# Patient Record
Sex: MALE | Race: WHITE | Employment: UNEMPLOYED | URBAN - METROPOLITAN AREA
[De-identification: names, ages, dates, MRNs, and addresses within clinical notes are randomized per-mention and may not be internally consistent; named-entity substitution may affect disease eponyms.]

---

## 2020-08-31 ENCOUNTER — HOSPITAL ENCOUNTER (OUTPATIENT)
Dept: PHYSICAL THERAPY | Age: 65
Setting detail: THERAPIES SERIES
Discharge: HOME | End: 2020-08-31
Payer: COMMERCIAL

## 2020-08-31 PROCEDURE — 97530 THERAPEUTIC ACTIVITIES: CPT

## 2020-08-31 PROCEDURE — 97162 PT EVAL MOD COMPLEX 30 MIN: CPT

## 2020-08-31 ASSESSMENT — PAIN SCALES - QUEBEC BACK PAIN DISABILITY SCALE
RUN ONE BLOCK OR 100M: 5
LIFT AND CARRY A HEAVY SUITCASE: 2
SIT IN A CHAIR FOR SEVERAL HOURS: 0
GET OUT OF BED: 0
REACH UP TO HIGH SHELVES: 1
SLEEP THROUGH THE NIGHT: 1
TAKE FOOD OUT OF THE REFRIGERATOR: 1
STAND UP FOR 20 TO 30 MINUTES: 4
THROW A BALL: 1
QUEBEC CMS MODIFIER: CK
LIFT AND CARRY A HEAVY SUITCASE: 2
TURN OVER IN BED: 1
CARRY TWO BAGS OF GROCERIES: 2
WALK SEVERAL KILOMETERS  OR MILES: 3
MOVE A CHAIR: 5
RUN ONE BLOCK OR 100M: 5
STAND UP FOR 20 TO 30 MINUTES: 4
RIDE IN A CAR: 1
BEND OVER TO CLEAN THE BATHTUB: 5
QUEBEC DISABILITY INDEX: 40-59%
REACH UP TO HIGH SHELVES: 1
GET OUT OF BED: 0
PULL OR PUSH HEAVY DOORS: 4
MOVE A CHAIR: 5
CARRY TWO BAGS OF GROCERIES: 2
WALK A FEW BLOCKS OR 300 TO 400M: 2
MAKE YOUR BED: 1
CLIMB ONE FLIGHT OF STAIRS: 2
RIDE IN A CAR: 1
WALK A FEW BLOCKS OR 300 TO 400M: 2
TURN OVER IN BED: 1
PULL OR PUSH HEAVY DOORS: 4
SLEEP THROUGH THE NIGHT: 1
CLIMB ONE FLIGHT OF STAIRS: 2
BEND OVER TO CLEAN THE BATHTUB: 5
SIT IN A CHAIR FOR SEVERAL HOURS: 0
PUT ON SOCKS OR PANYHOSE: 2
TAKE FOOD OUT OF THE REFRIGERATOR: 1
MAKE YOUR BED: 1
WALK SEVERAL KILOMETERS  OR MILES: 3
THROW A BALL: 1
TOTAL SCORE: 43
PUT ON SOCKS OR PANYHOSE: 2

## 2020-08-31 NOTE — PROGRESS NOTES
Physical Therapy  Initial Assessment  Date: 2020  Patient Name: Adan Vides  MRN: [id number]  : 1955     Treatment Diagnosis: Decreased functional mobilty post lumbar fusion redo 2020    Restrictions  Position Activity Restriction  Other position/activity restrictions: Low fall risk,  Per pt, \"NO BENDING, LIFTING, TWISTING, PULLING\"    Subjective   General  Chart Reviewed: Yes  Additional Pertinent Hx: DM2, HTN, HLD, tremor, depression, allergic rhinitis, renal CA,  right TKR 2019, two cryoablations. Referring Practitioner: Jagjit Butler  Referral Date : 20  Diagnosis: Lumbar fusion L3-5  PT Visit Information  Onset Date: 20  PT Insurance Information: BCBS  Subjective  Subjective: Had a decompression at [address] in  May 13 2019, developed synovial cyst.  To [place] for L3-5 fusion 2019. Was doing pretty well till he fell and slipped from the desk chair, and has had numbness and tingling in the left leg since that time. Per pt \"issues were noted at L3 and some of the screws are loose  \"Had a revision surgery in July of this year. In terms of syptoms pt has had resolution of pain in feet,  has some numbenss but less in both lateral thighs which is also less. Pain Screening  Patient Currently in Pain: Yes(4/10)      Vision/Hearing  Vision  Vision: Within Functional Limits(reading glasses)  Hearing  Hearing: Exceptions to WFL(has hearing aides but does not wear them.)  Hearing Exceptions: Hard of hearing/hearing concerns;Bilateral hearing aid    Orientation  Orientation  Overall Orientation Status: Within Normal Limits    Social/Functional History  Social/Functional History  Lives With: Spouse  Type of Home: House  Home Layout: One level; Laundry in basement  Home Access: Stairs to enter without rails  Entrance Stairs - Number of Steps: one plus one step to enter  Bathroom Shower/Tub: Walk-in shower  Bathroom Toilet: Handicap height  Bathroom Equipment: Built-in shower goals: Discharge 8 weeks  Short term goal 1: Normalize LE ROM to allow increased ease with dressing and bathing  Short term goal 2: Increase LE strength to 4+/5 to allow pt to rise from chair without use of UE  Short term goal 3: Functional trunk ROM for normal ADL and homemaking  Patient Goals   Patient goals : \"To get back as much range of motion as possible and get strength built back up in my legs.       Aquiles Martinez, [address] [facility]

## 2020-08-31 NOTE — FLOWSHEET NOTE
Physical Therapy Daily Treatment Note  Date:  2020    Patient Name:  Malinda Chavira    :  1955  MRN: [id number]    Restrictions/Precautions: Position Activity Restriction  Other position/activity restrictions: Low fall risk,  Per pt, \"NO BENDING, LIFTING, TWISTING, PULLING\"      Pertinent Medical History: Additional Pertinent Hx: DM2, HTN, HLD, tremor, depression, allergic rhinitis, renal CA,  right TKR 2019, two cryoablations. Medical/Treatment Diagnosis Information:  · Diagnosis: Lumbar fusion L3-5  · Treatment Diagnosis: Decreased functional mobilty post lumbar fusion redo 7106    Insurance/Certification information:  PT Insurance Information: [facility]  Physician Information:  Referring Practitioner: [facility] signed (Y/N):  sent on eval date    Visit# / total visits:    Pain level: 0/10     History of Injury:   Had a decompression at [address] in  May 13 2019, developed synovial cyst.  To [place] for L3-5 fusion 2019. Was doing pretty well till he fell and slipped from the desk chair, and has had numbness and tingling in the left leg since that time. Per pt \"issues were noted at L3 and some of the screws are loose  \"Had a revision surgery in July of this year. In terms of syptoms pt has had resolution of pain in feet,  has some numbenss but less in both lateral thighs which is also less. Subjective:   Currently uses brace when outside of the house,  Pain generally 4/10. Limited in terms of strength, endurance    Objective:   LE's WFL ROM, strength 4/5 except lisa hip flexion  4-/5      Exercise/Equipment Resistance/Repetitions Other comments        [facility]     Review of home set up  REview of prior PT     Aquatics at [facility]         REview of restrictions No BLT P                   Pool tour, review of policies and procedures Pt verbalized understanding.       Other Therapeutic Activities:  Pt was educated on PT POC, Diagnosis, Prognosis, related to loss of mobility, strength, balance, and coordination. Gait Training:  [] (60681) Provided training and instruction to the patient for proper postural muscle recruitment and positioning with ambulation re-education     Home Exercise Program:    [] (91855) Reviewed/Progressed HEP activities related to strengthening, flexibility, endurance, ROM for functional self-care, mobility, lifting and ambulation   [] (76426) Reviewed/Progressed HEP activities related to improving balance, coordination, kinesthetic sense, posture, motor skill, proprioception for self-care, mobility, lifting, and ambulation      Manual Treatments:  MFR / STM / Oscillations-Mobs:  G-I, II, III, IV / Manipulation / MLD  [] (57354) Provided manual therapy to mobilize  soft tissue/joints/fluid for the purpose of modulating pain, promoting relaxation, increasing ROM, reducing/eliminating soft tissue swelling/inflammation/restriction, improving soft tissue extensibility and allowing for proper ROM for normal function with self- care, mobility, lifting and ambulation.       Timed Code Treatment Minutes: 30   Total Treatment Minutes: 45     [] EVAL (LOW) 31192   [x] EVAL (MOD) 46243   [] EVAL (HIGH) 15743   [] RE-EVAL   [] TE (40470) x     [] Aquatic (85748) x  [] NMR (40221)   x  [] Aquatic Group (17538) x  [] Manual (53507) x    [] Ultrasound (41590) x  [x] TA (01861) x  2  [] Mech Traction (34786)  [] Ionto (76563)           [] ES (un) (97154):   [] Vasopump (98629) [] Other:            Electronically signed by:  Dois Severs, PT [id number] (DPT, MS)

## 2020-08-31 NOTE — PLAN OF CARE
Outpatient Physical Therapy  [x] [facility]    Phone: [phone]   Fax: [phone]   [] [facility]  Phone: [phone]              Fax: [phone]  [] [facility]   Phone: [phone]   Fax: [phone]     To: Referring Practitioner: Mirian Friedman      Patient: Monet Gonzalez   : 1955   MRN: [id number]  Evaluation Date: 2020      Diagnosis Information:  · Diagnosis: Lumbar fusion L3-5   · Treatment Diagnosis: Decreased functional mobilty post lumbar fusion redo 2020     Sameer Hurley  Dear Dr. Mirian Friedman  The following patient has been evaluated for physical therapy services and for therapy to continue, Medicare requires monthly physician review of the treatment plan. Please review the attached evaluation and/or summary of the patient's plan of care, and verify that you agree therapy should continue by signing the attached document and sending it back to our office. Plan of Care/Treatment to date:  [] Therapeutic Exercise    [] Modalities:  [] Therapeutic Activity     [] Ultrasound  [] Electrical Stimulation  [] Gait Training      [] Cervical Traction [] Lumbar Traction  [] Neuromuscular Re-education    [] Cold/hotpack [] Iontophoresis   [] Instruction in HEP     Other:  [] Manual Therapy      []             [x] Aquatic Therapy      []           ? Frequency/Duration:  # Days per week: [] 1 day # Weeks: [] 1 week [] 5 weeks     [x] 2 days? [] 2 weeks [] 6 weeks     [] 3 days   [] 3 weeks [] 7 weeks     [] 4 days   [] 4 weeks [x] 8 weeks    Rehab Potential: [] Excellent [x] Good [] Fair  [] Poor       Electronically signed by:  Laz Laguerre,       If you have any questions or concerns, please don't hesitate to call.   Thank you for your referral.      Physician Signature:________________________________Date:__________________  By signing above, therapists plan is approved by physician

## 2020-09-08 ENCOUNTER — APPOINTMENT (OUTPATIENT)
Dept: PHYSICAL THERAPY | Age: 65
End: 2020-09-08
Payer: COMMERCIAL

## 2020-09-10 ENCOUNTER — HOSPITAL ENCOUNTER (OUTPATIENT)
Dept: PHYSICAL THERAPY | Age: 65
Setting detail: THERAPIES SERIES
Discharge: HOME | End: 2020-09-10
Payer: COMMERCIAL

## 2020-09-10 PROCEDURE — 97113 AQUATIC THERAPY/EXERCISES: CPT

## 2020-09-10 NOTE — FLOWSHEET NOTE
Physical Therapy Aquatic Flow Sheet   Date:  9/10/2020    Patient Name:  Alba Morales    :  1955     Restrictions/Precautions: Position Activity Restriction  Other position/activity restrictions: Low fall risk,  Per pt, \"NO BENDING, LIFTING, TWISTING, PULLING\"      Pertinent Medical History: Additional Pertinent Hx: DM2, HTN, HLD, tremor, depression, allergic rhinitis, renal CA,  right TKR 2019, two cryoablations.     Medical/Treatment Diagnosis Information:  · Diagnosis: Lumbar fusion L3-5  · Treatment Diagnosis: Decreased functional mobilty post lumbar fusion redo 5389     Insurance/Certification information:  PT Insurance Information: [facility]  Physician Information:  Referring Practitioner: Serena Falk       F/U 10-8-20  Plan of care signed (Y/N):  sent on eval date     Visit# / total visits:    Pain level:      LB/ L LE  4/10                      Pain after Rx 4/10      History of Injury:   Had a decompression at [facility] in  May 13 2019, developed synovial cyst.  To [place] for L3-5 fusion 2019. Was doing pretty well till he fell and slipped from the desk chair, and has had numbness and tingling in the left leg since that time. Per pt \"issues were noted at L3 and some of the screws are loose  \"Had a revision surgery in July of this year. In terms of syptoms pt has had resolution of pain in feet,  has some numbenss but less in both lateral thighs which is also less.       Subjective:  C/o of LBP, L LE numbness. [facility]     Objective:   Observation:  See eval   Test measurements:      Key  B= Belt DB= Dumbells T= Theratube   G=Gloves N= Noodles W= Weights   P= Paddles WW=Water Walker K= Kickboard        Transfers:   steps ind/ cues for safety      % Immersion: 75%            Ambulation:  laps  ind Exercises UE:  B / standing balance/ L-S stab    Forwards 3 Shoulder Shrugs     Lateral  Shoulder circles     Marching    Scapular Retraction      Retro   Rolling  10 Cariocas  Push Downs 10     IR/ER 10     Punching    Stretching: B    30 sec  Rowing 10   Gastroc/Soleus  2 Elbow Flex/Ext 10   Hamstring  2   First step Shldr Flex/Ext     Knee flex stretch   Shldr Abd/Add    Piriformis   Horiz Abd/Add     Hip flexor    Arm Circles     SKTC   PNF Diagonals    DKTC  UE oscillations f/b, s/s    ITB   Wall Push-ups    Quad  Figure 8's    Mid back   Buoy pull/push downs    UT  Tband rows    Rhom  Tband lats    Post Shoulder  Lumbar Rot w/ paddles    Pec   Small ball pull downs                   diagonals             Cervical:       AROM Flex       AROM Extension     LEs exercises:  B AROM Side Bending    Marching  10 AROM Rotation    Heel Raises  10 Chin tucks    Toe Raises  10 Chin nods     Squats  6     aggravated      Hamstring Curls  10      Hip Flexion  10 Balance:      Hip Abduction 10 SLS    Hip Circles    cw 10 Tandem stance    TA set 10 NBOS eyes open    Glut Set 10 NBOS eyes closed    Hip Extension  Hand to Opposite Knee    Hip Adduction    Box Step     Hip IR   Noodle Stance     Hip ER  Stop/Go Gait    Fig 8's  Switch Gait                Seated: B     bench Functional:    Ankle Pumps 10 Step up forward    Ankle circles cw/ccw 10 Step up lateral    Knee flex & ext 10 Step down    Hip Abd & Adduction 10 [place] squats    Bicycle  10 Crate Lifts    Add Set with ball 10 Lunges forward    LX stab with med ball throws  Lunges lateral    Ankle INV  Lunges retro    Ankle EV  Lower ab curl with noodle      Upper ab curl with ball      Med ball straight lifts      Med ball diagonal lifts      Hydrorider          Noodle:      Leg Press  Deep Water:    Noodle hang at wall  Jog    Noodle hang deep water  Jumping Jacks    Noodle Bicycle  Heel to toe      Hand to opposite knee      Cross country skier      Rocking Horse        Charges:  Individual Aquatic Therapy:  [x] (45287) Provided verbal and tactile cueing for strengthening, flexibility, ROM using the therapeutic properties of water (buoyancy, resistance) for improvements in core control, mobility and ambulation     Aquatic Group:  [] (54393) Provided intermittent verbal and tactile cueing for strengthening, endurance, flexibility and ROM for 2-4 individuals that do not require one-on one patient contact by the therapist     Individual Aquatic Minutes: [address] Minutes:      [x] Aquatic (17952) x 3    [] Aquatic Group ([phone]) x    Treatment/Activity Tolerance:  [x] Patient tolerated treatment well [] Patient limited by fatique  [] Patient limited by pain  [] Patient limited by other medical complications  [x] Other: verbal cues for correct tech with ex. Advised on use of ice if needed for soreness post ex. Prognosis: [x] Good [] Fair  [] Poor    Patient Requires Follow-up: [x] Yes  [] No    Plan:   [x] Continue per plan of care [] Alter current plan (see comments)  [] Plan of care initiated [] Hold pending MD visit [] Discharge      Plan for Next Session:  Increase gt, hip circles ccw, increase UE ex with L-S stabilization.           Electronically signed by: Kolby Sesay,

## 2020-09-15 ENCOUNTER — HOSPITAL ENCOUNTER (OUTPATIENT)
Dept: PHYSICAL THERAPY | Age: 65
Setting detail: THERAPIES SERIES
Discharge: HOME | End: 2020-09-15
Payer: COMMERCIAL

## 2020-09-15 PROCEDURE — 97113 AQUATIC THERAPY/EXERCISES: CPT

## 2020-09-15 NOTE — FLOWSHEET NOTE
Physical Therapy Aquatic Flow Sheet   Date:  9/15/2020    Patient Name:  Zan Oropeza    :  1955     Restrictions/Precautions: Position Activity Restriction  Other position/activity restrictions: Low fall risk,  Per pt, \"NO BENDING, LIFTING, TWISTING, PULLING\"      Pertinent Medical History: Additional Pertinent Hx: DM2, HTN, HLD, tremor, depression, allergic rhinitis, renal CA,  right TKR 2019, two cryoablations.     Medical/Treatment Diagnosis Information:  · Diagnosis: Lumbar fusion L3-5  · Treatment Diagnosis: Decreased functional mobilty post lumbar fusion redo 9791     Insurance/Certification information:  PT Insurance Information: [facility]  Physician Information:  Referring Practitioner: Mando Harrison       F/U 10-8-20  Plan of care signed (Y/N):  sent on eval date     Visit# / total visits:  3/16  Pain level:      LB/ L LE  4/10                      Pain after Rx 4/10      History of Injury:   Had a decompression at [address] in  May 13 2019, developed synovial cyst.  To [place] for L3-5 fusion 2019. Was doing pretty well till he fell and slipped from the desk chair, and has had numbness and tingling in the left leg since that time. Per pt \"issues were noted at L3 and some of the screws are loose  \"Had a revision surgery in July of this year. In terms of syptoms pt has had resolution of pain in feet,  has some numbenss but less in both lateral thighs which is also less.       Subjective:  Reports increased pain x 2 days 10 after first aquatic ex. Reports at home mostly sitting/ lying in recliner and doing very light activity. Trying to walk 10 minutes 5-6 x/ day. Did apply ice 1 x after first aquatic session.      Objective:   Observation:  See eval   Test measurements:      Key  B= Belt DB= Dumbells T= Theratube   G=Gloves N= Noodles W= Weights   P= Paddles WW=Water Walker K= Kickboard        Transfers:   steps ind/ cues for safety      % Immersion: 75% Ambulation:  laps  ind Exercises UE:  B / standing balance/ L-S stab, slow gentle,    Forwards 3 Shoulder Shrugs 10    Lateral  Shoulder circles bckwds 10    Marching    Scapular Retraction      Retro   Rolling  10    Cariocas  Push Downs 10     IR/ER 10     Punching    Stretching: B    30 sec  Rowing 10   Gastroc/Soleus Elbow Flex/Ext 10   Hamstring Shldr Flex/Ext     Knee flex stretch   Shldr Abd/Add    Piriformis   Horiz Abd/Add     Hip flexor    Arm Circles     SKTC   PNF Diagonals    DKTC  UE oscillations f/b, s/s    ITB   Wall Push-ups    Quad  Figure 8's    Mid back   Buoy pull/push downs    UT  Tband rows    Rhom  Tband lats    Post Shoulder  Lumbar Rot w/ paddles    Pec   Small ball pull downs                   diagonals             Cervical:       AROM Flex       AROM Extension     LEs exercises:  B  slow, gentle,  AROM Side Bending    Marching  10 AROM Rotation    Heel Raises  10 Chin tucks    Toe Raises  10 Chin nods     Squats  10    manju ok today      Hamstring Curls  10      Hip Flexion  10 Balance:      Hip Abduction 10 SLS    Hip Circles    cw 10 Tandem stance    TA set 10 NBOS eyes open    Glut Set 10 NBOS eyes closed    Hip Extension  Hand to Opposite Knee    Hip Adduction    Box Step     Hip IR   Noodle Stance     Hip ER  Stop/Go Gait    Fig 8's  Switch Gait                Seated: B     bench Functional:    Ankle Pumps Step up forward    Ankle circles cw/ccw Step up lateral    Knee flex & ext Step down    Hip Abd & Adduction [place] squats    Bicycle  Crate Lifts    Add Set with ball Lunges forward    LX stab with med ball throws  Lunges lateral    Ankle INV  Lunges retro    Ankle EV  Lower ab curl with noodle      Upper ab curl with ball      Med ball straight lifts      Med ball diagonal lifts      Hydrorider          Noodle:      Leg Press  Deep Water:    Noodle hang at wall  Jog    Noodle hang deep water  Jumping Jacks    Noodle Bicycle  Heel to toe      Hand to opposite knee      [facility] country skier      Rocking Horse        Charges:  Individual Aquatic Therapy:  [x] (68428) Provided verbal and tactile cueing for strengthening, flexibility, ROM using the therapeutic properties of water (buoyancy, resistance) for improvements in core control, mobility and ambulation     Aquatic Group:  [] (47889) Provided intermittent verbal and tactile cueing for strengthening, endurance, flexibility and ROM for 2-4 individuals that do not require one-on one patient contact by the therapist     Individual Aquatic Minutes: 40   Aquatic Group Minutes:      [x] Aquatic (43147) x 3    [] Aquatic Group ([phone]) x    Treatment/Activity Tolerance:  [x] Patient tolerated treatment well [] Patient limited by fatique  [] Patient limited by pain  [] Patient limited by other medical complications  [x] Other: verbal cues for correct tech with ex. Seems to flare easily. Advised to increase use of ice after aquatic ex 3 x . All ex slow, gentle w/o increased pain. Prognosis: [x] Good [] Fair  [] Poor    Patient Requires Follow-up: [x] Yes  [] No    Plan:   [x] Continue per plan of care [] Alter current plan (see comments)  [] Plan of care initiated [] Hold pending MD visit [] Discharge      Plan for Next Session:  Increase gt, hip circles ccw, increase UE ex with L-S stabilization. as tolerated.           Electronically signed by: Tonny Bowman,

## 2020-09-17 ENCOUNTER — HOSPITAL ENCOUNTER (OUTPATIENT)
Dept: PHYSICAL THERAPY | Age: 65
Setting detail: THERAPIES SERIES
Discharge: HOME | End: 2020-09-17
Payer: COMMERCIAL

## 2020-09-17 PROCEDURE — 97113 AQUATIC THERAPY/EXERCISES: CPT

## 2020-09-17 NOTE — FLOWSHEET NOTE
Physical Therapy Aquatic Flow Sheet   Date:  2020    Patient Name:  Aileen Frias    :  1955     Restrictions/Precautions: Position Activity Restriction  Other position/activity restrictions: Low fall risk,  Per pt, \"NO BENDING, LIFTING, TWISTING, PULLING\"      Pertinent Medical History: Additional Pertinent Hx: DM2, HTN, HLD, tremor, depression, allergic rhinitis, renal CA,  right TKR 2019, two cryoablations.     Medical/Treatment Diagnosis Information:  · Diagnosis: Lumbar fusion L3-5  · Treatment Diagnosis: Decreased functional mobilty post lumbar fusion redo 4204     Insurance/Certification information:  PT Insurance Information: [facility]  Physician Information:  Referring Practitioner: Abby Vela       F/U 10-8-20  Plan of care signed (Y/N):  sent on eval date     Visit# / total visits:    Pain level:      LB/ L LE  4/10                      Pain after Rx 4/10      History of Injury:   Had a decompression at [address] in  May 13 2019, developed synovial cyst.  To [place] for L3-5 fusion 2019. Was doing pretty well till he fell and slipped from the desk chair, and has had numbness and tingling in the left leg since that time. Per pt \"issues were noted at L3 and some of the screws are loose  \"Had a revision surgery in July of this year. In terms of syptoms pt has had resolution of pain in feet,  has some numbenss but less in both lateral thighs which is also less.       Subjective:  reports less soreness after last aquatic ex that evening, better by next am.  Pt reports has had Knot at times L LB since Sx.     Objective:   Observation:  No TTP LPVM, no knot present today L LB   Test measurements:      Key  B= Belt DB= Dumbells T= Theratube   G=Gloves N= Noodles W= Weights   P= Paddles WW=Water Walker K= Kickboard        Transfers:   steps ind/ cues for safety      % Immersion: 75%            Ambulation:  laps  ind Exercises UE:  B / standing balance/ L-S stab, slow gentle, Forwards 3+1  Shoulder Shrugs 10    Lateral  Shoulder circles bckwds 10    Marching    Scapular Retraction      Retro   Rolling  10    Cariocas  Push Downs 10     IR/ER 10     Punching    Stretching: B    30 sec  Rowing 10   Gastroc/Soleus Elbow Flex/Ext 10   Hamstring Shldr Flex/Ext  10   Knee flex stretch   Shldr Abd/Add    Piriformis   Horiz Abd/Add     Hip flexor    Arm Circles     SKTC   PNF Diagonals    DKTC  UE oscillations f/b, s/s    ITB   Wall Push-ups    Quad  Figure 8's    Mid back   Buoy pull/push downs    UT  Tband rows    Rhom  Tband lats    Post Shoulder  Lumbar Rot w/ paddles    Pec   Small ball pull downs                   diagonals             Cervical:       AROM Flex       AROM Extension     LEs exercises:  B  slow, gentle,  AROM Side Bending    Marching  10 AROM Rotation    Heel Raises  10 Chin tucks    Toe Raises  10 Chin nods     Squats  10         Hamstring Curls  10      Hip Flexion  10 Balance:      Hip Abduction 10 SLS    Hip Circles    cw/ccw 10 Tandem stance    TA set 10 NBOS eyes open    Glut Set 10 NBOS eyes closed    Hip Extension  Hand to Opposite Knee    Hip Adduction    Box Step     Hip IR   Noodle Stance     Hip ER  Stop/Go Gait    Fig 8's  Switch Gait                Seated: B     Bench   some relief with sitting Functional:    Ankle Pumps 10 Step up forward    Ankle circles cw/ccw 10 Step up lateral    Knee flex & ext 10 Step down    Hip Abd & Adduction 10 [place] squats    Bicycle  10 Crate Lifts    Add Set with ball 10 Lunges forward    LX stab with med ball throws  Lunges lateral    Ankle INV  Lunges retro    Ankle EV  Lower ab curl with noodle      Upper ab curl with ball      Med ball straight lifts      Med ball diagonal lifts      Hydrorider          Noodle:      Leg Press  Deep Water:    Noodle hang at wall  Jog    Noodle hang deep water  Jumping Jacks    Noodle Bicycle  Heel to toe      Hand to opposite knee      Cross country skier      Rocking Horse Charges:  Individual Aquatic Therapy:  [x] (58029) Provided verbal and tactile cueing for strengthening, flexibility, ROM using the therapeutic properties of water (buoyancy, resistance) for improvements in core control, mobility and ambulation     Aquatic Group:  [] (76950) Provided intermittent verbal and tactile cueing for strengthening, endurance, flexibility and ROM for 2-4 individuals that do not require one-on one patient contact by the therapist     Individual Aquatic Minutes: [address] Minutes:      [x] Aquatic (48425) x 3    [] Aquatic Group (22243) x    Treatment/Activity Tolerance:  [x] Patient tolerated treatment well [] Patient limited by fatique  [] Patient limited by pain  [] Patient limited by other medical complications  [x] Other: verbal cues for correct tech/posture/ TA activation with ex. Seems to flare easily. Advised to increase use of ice after aquatic ex 3 x . All ex slow, gentle w/o increased pain. Monitor ex carefully as not to exacerbate pain. Relief from sitting after increased discomfort LB from standing/ walking activity ~ 30min. Prognosis: [x] Good [] Fair  [] Poor    Patient Requires Follow-up: [x] Yes  [] No    Plan:   [x] Continue per plan of care [] Alter current plan (see comments)  [] Plan of care initiated [] Hold pending MD visit [] Discharge      Plan for Next Session:  Increase gt, hip circles ccw, increase UE ex with L-S stabilization. as tolerated.           Electronically signed by: Aleena Rg,

## 2020-09-22 ENCOUNTER — APPOINTMENT (OUTPATIENT)
Dept: PHYSICAL THERAPY | Age: 65
End: 2020-09-22
Payer: COMMERCIAL

## 2020-09-24 ENCOUNTER — HOSPITAL ENCOUNTER (OUTPATIENT)
Dept: PHYSICAL THERAPY | Age: 65
Setting detail: THERAPIES SERIES
Discharge: HOME | End: 2020-09-24
Payer: COMMERCIAL

## 2020-09-24 PROCEDURE — 97113 AQUATIC THERAPY/EXERCISES: CPT

## 2020-09-24 NOTE — FLOWSHEET NOTE
Ambulation:  laps  ind Exercises UE:  B / standing balance/ L-S stab, slow gentle,    Forwards 3 Shoulder Shrugs    Lateral  Shoulder circles bckwds    Marching    Scapular Retraction  10    Retro   Rolling  10    Cariocas  Push Downs 10     IR/ER 10     Punching    Stretching: B    30 sec  Rowing 10   Gastroc/Soleus  2Elbow Flex/Ext 10   Hamstring Shldr Flex/Ext  10   Knee flex stretch   Shldr Abd/Add 10   Piriformis   Horiz Abd/Add  10   Hip flexor    Arm Circles  10   SKTC   PNF Diagonals 10   DKTC  UE oscillations f/b, s/s    ITB   Wall Push-ups    Quad  Figure 8's    Mid back   Buoy pull/push downs    UT  Tband rows    Rhom  Tband lats    Post Shoulder  Lumbar Rot w/ paddles    Pec   Small ball pull downs                   diagonals             Cervical:       AROM Flex       AROM Extension     LEs exercises:  B  slow, gentle,  AROM Side Bending    Marching  10 AROM Rotation    Heel Raises  10 Chin tucks    Toe Raises  10 Chin nods     Squats  10         Hamstring Curls  10      Hip Flexion  10 Balance: Hip Abduction 10 SLS    Hip Circles    cw/ccw 10 Tandem stance    TA set  NBOS eyes open    Glut Set 10 NBOS eyes closed    Hip Extension  Hand to Opposite Knee    Hip Adduction    Box Step     Hip IR   Noodle Stance     Hip ER  Stop/Go Gait    Fig 8's  Switch Gait                Seated: B     Bench      relief with sitting.  Relief with ding sitting ex mid-way thru treatmentt Functional:    Ankle Pumps 15 Step up forward    Ankle circles cw/ccw 10 Step up lateral    Knee flex & ext 15 Step down    Hip Abd & Adduction 15 Dunbar squats    Bicycle  15 Crate Lifts    Add Set with ball 10 Lunges forward    LX stab with med ball throws  Lunges lateral    Ankle INV  Lunges retro    Ankle EV  Lower ab curl with noodle      Upper ab curl with ball      Med ball straight lifts      Med ball diagonal lifts      Hydrorider          Noodle:      Leg Press  Deep Water:    Noodle hang at wall  Jog    Noodle hang deep water  Jumping Jacks    Noodle Bicycle  Heel to toe      Hand to opposite knee      Cross country skier      Rocking Horse        Charges:  Individual Aquatic Therapy:  [x] (27638) Provided verbal and tactile cueing for strengthening, flexibility, ROM using the therapeutic properties of water (buoyancy, resistance) for improvements in core control, mobility and ambulation     Aquatic Group:  [] (53894) Provided intermittent verbal and tactile cueing for strengthening, endurance, flexibility and ROM for 2-4 individuals that do not require one-on one patient contact by the therapist     Individual Aquatic Minutes: [address] Minutes:      [x] Aquatic ([phone]) x 3    [] Aquatic Group (69412) x    Treatment/Activity Tolerance:  [x] Patient tolerated treatment well [] Patient limited by fatique  [] Patient limited by pain  [] Patient limited by other medical complications  [x] Other:  Pt tolerates ex better with doing seated ex mid-way thru Rx for relief with seated position. Gradually tolerating ex with less discomfort during and after. Prognosis: [x] Good [] Fair  [] Poor    Patient Requires Follow-up: [x] Yes  [] No    Plan:   [x] Continue per plan of care [] Alter current plan (see comments)  [] Plan of care initiated [] Hold pending MD visit [] Discharge      Plan for Next Session:  Gradually progress L-S stabilization exercises as tolerated to increase strength, flexibility, ROM, and endurance to improve ADLS and function and decrease pain.      ADD: fwd step ups, lateral gt      Electronically signed by: Bill Burnett,

## 2020-09-29 ENCOUNTER — APPOINTMENT (OUTPATIENT)
Dept: PHYSICAL THERAPY | Age: 65
End: 2020-09-29
Payer: COMMERCIAL

## 2020-10-26 NOTE — FLOWSHEET NOTE
Physical Therapy discharge   Date:  10/26/2020    Patient Name:  Nahid Rivero    :  1955     Restrictions/Precautions: Position Activity Restriction  Other position/activity restrictions: Low fall risk,  Per pt, \"NO BENDING, LIFTING, TWISTING, PULLING\"      Pertinent Medical History: Additional Pertinent Hx: DM2, HTN, HLD, tremor, depression, allergic rhinitis, renal CA,  right TKR 2019, two cryoablations.     Medical/Treatment Diagnosis Information:  · Diagnosis: Lumbar fusion L3-5  · Treatment Diagnosis: Decreased functional mobilty post lumbar fusion redo 6903     Insurance/Certification information:  PT Insurance Information: [facility]  Physician Information:  Referring Practitioner: Jeanmarie Howard       F/U 10-8-20  Plan of care signed (Y/N):  sent on eval date     Visit# / total visits:   Pain level:      LB/ L LE  4/10                      Pain after Rx 4.5/10      History of Injury:   Had a decompression at [address] in  May 13 2019, developed synovial cyst.  To [place] for L3-5 fusion 2019. Was doing pretty well till he fell and slipped from the desk chair, and has had numbness and tingling in the left leg since that time. Per pt \"issues were noted at L3 and some of the screws are loose  \"Had a revision surgery in July of this year. In terms of syptoms pt has had resolution of pain in feet,  has some numbenss but less in both lateral thighs which is also less.       Subjective:   Resuming aquatic ex after out of town. Reports vacation with train ride aggravated some. Mild increased discomfort after last Rx for several hours then resolved. Not as much increased pain as first time in pool. Objective:   Observation:  No TTP LPVM, - Refer to evaluation for objective information   \   Assessment -  Pt contact PT to state the he was having increased pain with aquatic therapy despite changes in the program to accommodate his level of mobility and function.    He decided to hold further PT at this time.     Plan - DC OPPT      Electronically signed by: Janny Groves,

## 2024-06-20 ENCOUNTER — TELEPHONE (OUTPATIENT)
Dept: CARDIOLOGY CLINIC | Age: 69
End: 2024-06-20

## 2024-06-20 NOTE — TELEPHONE ENCOUNTER
New patient appointment with Dr. Houser on 6/25/24. I can see some things in Care Everywhere, but please request patient's CABG procedure report from 10/24/23 (I believe this was in [place], [place] - [facility]. Procedure was with Dr. Flores), most recent echocardiogram report and the disc if able. Most recent CT, carotid Doppler, lower extremity arterial Doppler, reflux study, heart monitor, or any other cardiac testing. From [facility]. Thank you!

## 2024-06-20 NOTE — TELEPHONE ENCOUNTER
Called [facility] - number given to [facility] [phone] as patient followed with a Dr. Ruben Tellez and Dr. Obdulio Osorio. Irma from [facility] states they don't have any records for Jeremie but [facility] should.     Spoke to Gagandeep at [facility] who stated a request must first be faxed. Faxed over cover sheet to [phone] Attn: Gagandeep.

## 2024-06-20 NOTE — PROGRESS NOTES
[facility]      Cardiology Consult    Jeremie Heard  1955 June 25, 2024    Referring Physician: Ronni Galeano NP  Reason for Referral: CAD/CHF/atrial fibrillation     CC: \"I don't feel too bad\"     HPI:  The patient is 69 y.o. male with a past medical history significant for SALVATORE, hypertension, hyperlipidemia, atrial fibrillation (dx 2/2023) s/p DCCV 2/20/23, CHF, and CAD s/p x1 MARK to RCA (3/14/23), s/p CABG/MV repair with Neri annuloplasty ring/MAZE/Atriclip (10/24/23) in [place], [place] who presents for chronic management of CAD, CHF, and atrial fibrillation. He was following with cardiology at [facility], then moved to [place] and followed with cardiology there and was last seen on 2/1/24. He reported chest pain at his cardiology OV 10/6/23 and completed a normal stress test. He presented to the ED 10/19/23 with recurrent chest pains and underwent an angiogram. He reportedly had normal troponins at the time but he was found to have severe multivessel disease and proceed with CABG. According to his cardiology OV 2/1/24, he has no documented recurrent of atrial fibrillation since his CABG/maze. Today he presents to establish care and states that overall he is feeling well. He denies any new sounding cardiac complaints. He denies any chest pains or worsening shortness of breath. He states he moved back to [place] April 2024. He reports medication compliance but has numerous intolerances to medications. There is a reported allergy to lisinopril reported as \"cough\" but the medication was restarted after his CABG. His two main concerns are if he should continue Eliquis and his blood pressure control. He denies any abnormal bleeding or bruising. He denies exertional chest pain/pressure, dyspnea at rest, worsening MURPHY, PND, orthopnea, palpitations, lightheadedness, weight changes, changes in LE edema, and syncope.    Past Medical History:   Diagnosis Date    Allergic rhinitis     Atrial

## 2024-06-21 NOTE — TELEPHONE ENCOUNTER
Medical Record form was faxed back to be completed with cover sheet.     Form completed and faxed back to [phone].

## 2024-06-25 ENCOUNTER — OFFICE VISIT (OUTPATIENT)
Dept: CARDIOLOGY CLINIC | Age: 69
End: 2024-06-25
Payer: MEDICARE

## 2024-06-25 VITALS
SYSTOLIC BLOOD PRESSURE: 94 MMHG | BODY MASS INDEX: 43.95 KG/M2 | WEIGHT: 307 LBS | OXYGEN SATURATION: 95 % | DIASTOLIC BLOOD PRESSURE: 62 MMHG | HEIGHT: 70 IN | HEART RATE: 73 BPM

## 2024-06-25 DIAGNOSIS — I50.32 CHRONIC DIASTOLIC HEART FAILURE (HCC): Primary | ICD-10-CM

## 2024-06-25 DIAGNOSIS — Z95.1 S/P CABG (CORONARY ARTERY BYPASS GRAFT): ICD-10-CM

## 2024-06-25 DIAGNOSIS — I48.0 PAF (PAROXYSMAL ATRIAL FIBRILLATION) (HCC): ICD-10-CM

## 2024-06-25 DIAGNOSIS — Z98.890 S/P MVR (MITRAL VALVE REPAIR): ICD-10-CM

## 2024-06-25 DIAGNOSIS — I25.10 CORONARY ARTERY DISEASE INVOLVING NATIVE CORONARY ARTERY OF NATIVE HEART WITHOUT ANGINA PECTORIS: ICD-10-CM

## 2024-06-25 PROCEDURE — 3078F DIAST BP <80 MM HG: CPT | Performed by: INTERNAL MEDICINE

## 2024-06-25 PROCEDURE — 93000 ELECTROCARDIOGRAM COMPLETE: CPT | Performed by: INTERNAL MEDICINE

## 2024-06-25 PROCEDURE — G8417 CALC BMI ABV UP PARAM F/U: HCPCS | Performed by: INTERNAL MEDICINE

## 2024-06-25 PROCEDURE — 1123F ACP DISCUSS/DSCN MKR DOCD: CPT | Performed by: INTERNAL MEDICINE

## 2024-06-25 PROCEDURE — G8427 DOCREV CUR MEDS BY ELIG CLIN: HCPCS | Performed by: INTERNAL MEDICINE

## 2024-06-25 PROCEDURE — 1036F TOBACCO NON-USER: CPT | Performed by: INTERNAL MEDICINE

## 2024-06-25 PROCEDURE — 99204 OFFICE O/P NEW MOD 45 MIN: CPT | Performed by: INTERNAL MEDICINE

## 2024-06-25 PROCEDURE — 3074F SYST BP LT 130 MM HG: CPT | Performed by: INTERNAL MEDICINE

## 2024-06-25 PROCEDURE — 3017F COLORECTAL CA SCREEN DOC REV: CPT | Performed by: INTERNAL MEDICINE

## 2024-06-25 RX ORDER — GABAPENTIN 800 MG/1
800 TABLET ORAL 3 TIMES DAILY
COMMUNITY
Start: 2018-11-28

## 2024-06-25 RX ORDER — EZETIMIBE 10 MG/1
10 TABLET ORAL DAILY
COMMUNITY
Start: 2018-11-28

## 2024-06-25 RX ORDER — OMEGA-3-ACID ETHYL ESTERS 1 G/1
2 CAPSULE, LIQUID FILLED ORAL 2 TIMES DAILY
COMMUNITY
Start: 2024-05-24 | End: 2025-05-24

## 2024-06-25 RX ORDER — SPIRONOLACTONE 50 MG/1
50 TABLET, FILM COATED ORAL DAILY
COMMUNITY
Start: 2023-04-17 | End: 2024-06-25 | Stop reason: SDUPTHER

## 2024-06-25 RX ORDER — LISINOPRIL 5 MG/1
5 TABLET ORAL DAILY
Qty: 90 TABLET | Refills: 1
Start: 2024-06-25

## 2024-06-25 RX ORDER — ROPINIROLE 1 MG/1
1 TABLET, FILM COATED ORAL 3 TIMES DAILY
COMMUNITY
Start: 2022-02-01

## 2024-06-25 RX ORDER — METOPROLOL SUCCINATE 25 MG/1
25 TABLET, EXTENDED RELEASE ORAL DAILY
COMMUNITY

## 2024-06-25 RX ORDER — METHOCARBAMOL 750 MG/1
750 TABLET, FILM COATED ORAL 2 TIMES DAILY
COMMUNITY
Start: 2019-12-10

## 2024-06-25 RX ORDER — SPIRONOLACTONE 25 MG/1
25 TABLET ORAL DAILY
Qty: 90 TABLET | Refills: 3 | Status: SHIPPED | OUTPATIENT
Start: 2024-06-25

## 2024-06-25 RX ORDER — NITROGLYCERIN 0.4 MG/1
0.4 TABLET SUBLINGUAL EVERY 5 MIN PRN
COMMUNITY
Start: 2023-01-18

## 2024-06-25 RX ORDER — BUMETANIDE 1 MG/1
1 TABLET ORAL DAILY
COMMUNITY
Start: 2024-04-15

## 2024-06-25 RX ORDER — DAPAGLIFLOZIN 10 MG/1
10 TABLET, FILM COATED ORAL EVERY MORNING
Qty: 90 TABLET | Refills: 3 | Status: SHIPPED | OUTPATIENT
Start: 2024-06-25

## 2024-06-25 NOTE — PATIENT INSTRUCTIONS
-Decrease Aldactone to 25 mg daily.  -Start taking Farxiga 10 mg daily. PLease call the office if unable to tolerate or cost prohibitive.  -Please call the office with an update on Repatha affordability.  -Referral to cardiac rehab placed.   -Proceed with cardiac CTA. We will call you with results.    Patient called to  inform that the Pepcid was sent to the wrong pharmacy. I called [facility] and gave a verbal to the pharmacist with the instructions on it per Dr. Fairbanks.

## 2024-07-01 ENCOUNTER — TELEPHONE (OUTPATIENT)
Dept: CARDIOLOGY CLINIC | Age: 69
End: 2024-07-01

## 2024-07-01 ENCOUNTER — HOSPITAL ENCOUNTER (OUTPATIENT)
Dept: CT IMAGING | Age: 69
Discharge: HOME | End: 2024-07-01
Attending: INTERNAL MEDICINE
Payer: MEDICARE

## 2024-07-01 VITALS — SYSTOLIC BLOOD PRESSURE: 114 MMHG | DIASTOLIC BLOOD PRESSURE: 62 MMHG | HEART RATE: 88 BPM

## 2024-07-01 DIAGNOSIS — I25.10 CORONARY ARTERY DISEASE INVOLVING NATIVE CORONARY ARTERY OF NATIVE HEART WITHOUT ANGINA PECTORIS: ICD-10-CM

## 2024-07-01 DIAGNOSIS — Z95.1 S/P CABG (CORONARY ARTERY BYPASS GRAFT): ICD-10-CM

## 2024-07-01 PROCEDURE — 2500000003 HC RX 250 WO HCPCS: Performed by: RADIOLOGY

## 2024-07-01 RX ORDER — METOPROLOL TARTRATE 1 MG/ML
5 INJECTION, SOLUTION INTRAVENOUS ONCE
Status: COMPLETED | OUTPATIENT
Start: 2024-07-01 | End: 2024-07-01

## 2024-07-01 RX ADMIN — METOPROLOL TARTRATE 5 MG: 1 INJECTION, SOLUTION INTRAVENOUS at 14:43

## 2024-07-01 RX ADMIN — METOPROLOL TARTRATE 5 MG: 1 INJECTION, SOLUTION INTRAVENOUS at 14:48

## 2024-07-01 RX ADMIN — METOPROLOL TARTRATE 5 MG: 1 INJECTION, SOLUTION INTRAVENOUS at 14:39

## 2024-07-01 RX ADMIN — METOPROLOL TARTRATE 5 MG: 1 INJECTION, SOLUTION INTRAVENOUS at 14:53

## 2024-07-01 NOTE — TELEPHONE ENCOUNTER
Received a call from [facility] CTA dept. They informed that Jeremie was unable to complete CTA due to HR being in the 80's. They are advising ordering Metoprolol prep prior to next test date.     Will contact patient to be rescheduled.      Jeremie's callback: [phone]

## 2024-07-12 ENCOUNTER — HOSPITAL ENCOUNTER (OUTPATIENT)
Dept: CT IMAGING | Age: 69
Discharge: HOME | End: 2024-07-12
Attending: INTERNAL MEDICINE
Payer: MEDICARE

## 2024-07-12 DIAGNOSIS — Z95.1 S/P CABG (CORONARY ARTERY BYPASS GRAFT): ICD-10-CM

## 2024-07-12 DIAGNOSIS — I25.10 CORONARY ARTERY DISEASE INVOLVING NATIVE CORONARY ARTERY OF NATIVE HEART WITHOUT ANGINA PECTORIS: ICD-10-CM

## 2024-07-12 PROCEDURE — 71275 CT ANGIOGRAPHY CHEST: CPT

## 2024-07-26 ENCOUNTER — TELEPHONE (OUTPATIENT)
Dept: CARDIOLOGY CLINIC | Age: 69
End: 2024-07-26

## 2024-08-02 NOTE — PROGRESS NOTES
[facility]      Cardiology Consult    Jeremie Heard  1955 August 6, 2024    Referring Physician: Ronni Galeano NP  Reason for Referral: CAD/CHF/atrial fibrillation     CC: \"I'm doing good\"     HPI:  The patient is 69 y.o. male with a past medical history significant for SALVATORE, hypertension, hyperlipidemia, atrial fibrillation (dx 2/2023) s/p DCCV 2/20/23, CHF, and CAD s/p x1 MARK to RCA (3/14/23), s/p CABG/MV repair with Neri annuloplasty ring/MAZE/Atriclip (10/24/23) in [place], [place] who presents for chronic management of CAD, CHF, and atrial fibrillation. He was following with cardiology at [facility], then moved to [place] and followed with cardiology there and was last seen on 2/1/24. He reported chest pain at his cardiology OV 10/6/23 and completed a normal stress test. He presented to the ED 10/19/23 with recurrent chest pains and underwent an angiogram. He reportedly had normal troponins at the time but he was found to have severe multivessel disease and proceed with CABG. According to his cardiology OV 2/1/24, he has no documented recurrent of atrial fibrillation since his CABG/maze. There is a reported allergy to lisinopril reported as \"cough\" but the medication was restarted after his CABG and is tolerating.   Today he presents for follow up and states that overall he is feeling well. He denies any new sounding cardiac complaints. He denies any chest pains or worsening shortness of breath. He reports medication compliance and is tolerating. He denies any abnormal bleeding or bruising. He denies exertional chest pain/pressure, dyspnea at rest, worsening MURPHY, PND, orthopnea, palpitations, lightheadedness, weight changes, changes in LE edema, and syncope.    Past Medical History:   Diagnosis Date    Allergic rhinitis     Atrial fibrillation (HCC)     CAD (coronary artery disease)     Depression     Hyperlipidemia     Hypertension     Tremor     Type II or unspecified type diabetes

## 2024-08-06 ENCOUNTER — OFFICE VISIT (OUTPATIENT)
Dept: CARDIOLOGY CLINIC | Age: 69
End: 2024-08-06
Payer: MEDICARE

## 2024-08-06 VITALS
HEIGHT: 70 IN | BODY MASS INDEX: 43.95 KG/M2 | OXYGEN SATURATION: 94 % | HEART RATE: 82 BPM | DIASTOLIC BLOOD PRESSURE: 64 MMHG | SYSTOLIC BLOOD PRESSURE: 106 MMHG | WEIGHT: 307 LBS

## 2024-08-06 DIAGNOSIS — I25.10 CORONARY ARTERY DISEASE INVOLVING NATIVE CORONARY ARTERY OF NATIVE HEART WITHOUT ANGINA PECTORIS: ICD-10-CM

## 2024-08-06 DIAGNOSIS — Z98.890 S/P MVR (MITRAL VALVE REPAIR): ICD-10-CM

## 2024-08-06 DIAGNOSIS — Z95.1 S/P CABG (CORONARY ARTERY BYPASS GRAFT): ICD-10-CM

## 2024-08-06 DIAGNOSIS — I50.32 CHRONIC DIASTOLIC HEART FAILURE (HCC): Primary | ICD-10-CM

## 2024-08-06 DIAGNOSIS — I48.0 PAF (PAROXYSMAL ATRIAL FIBRILLATION) (HCC): ICD-10-CM

## 2024-08-06 PROCEDURE — G8427 DOCREV CUR MEDS BY ELIG CLIN: HCPCS | Performed by: INTERNAL MEDICINE

## 2024-08-06 PROCEDURE — 3078F DIAST BP <80 MM HG: CPT | Performed by: INTERNAL MEDICINE

## 2024-08-06 PROCEDURE — 3074F SYST BP LT 130 MM HG: CPT | Performed by: INTERNAL MEDICINE

## 2024-08-06 PROCEDURE — 3017F COLORECTAL CA SCREEN DOC REV: CPT | Performed by: INTERNAL MEDICINE

## 2024-08-06 PROCEDURE — 1036F TOBACCO NON-USER: CPT | Performed by: INTERNAL MEDICINE

## 2024-08-06 PROCEDURE — G8417 CALC BMI ABV UP PARAM F/U: HCPCS | Performed by: INTERNAL MEDICINE

## 2024-08-06 PROCEDURE — 99214 OFFICE O/P EST MOD 30 MIN: CPT | Performed by: INTERNAL MEDICINE

## 2024-08-06 PROCEDURE — 1123F ACP DISCUSS/DSCN MKR DOCD: CPT | Performed by: INTERNAL MEDICINE

## 2024-08-06 RX ORDER — ASPIRIN 325 MG
325 TABLET ORAL DAILY
Qty: 30 TABLET | Refills: 3
Start: 2024-08-06

## 2024-08-08 ENCOUNTER — TELEPHONE (OUTPATIENT)
Dept: CARDIOLOGY CLINIC | Age: 69
End: 2024-08-08

## 2024-08-08 NOTE — TELEPHONE ENCOUNTER
CARDIAC CLEARANCE     Procedure: Spinal cord stimulator implant  : Dr Derrell Aly MD  Date: 9/25/24    Medications needing held:     How long?:    Phone Number and Contact Name for Physicians office: [phone]  Fax number to send information: [phone]    Clinical staff:    Cardiologist: Dr Houser  Last Appointment: 8/6/24  Next Appointment: 2/17/25  Cardiac History:  Medical history significant for SALVATORE, hypertension, hyperlipidemia, atrial fibrillation (dx 2/2023) s/p DCCV 2/20/23, CHF, and CAD s/p x1 MARK to RCA (3/14/23), s/p CABG/MV repair with Neri annuloplasty ring/MAZE/Atriclip (10/24/23) in [place], [place] who presents for chronic management of CAD, CHF, and atrial fibrillation.   He presented to the ED 10/19/23 with recurrent chest pains and underwent an angiogram. He reportedly had normal troponins at the time but he was found to have severe multivessel disease and proceed with CABG.     [facility] 10/21/23 ([facility])  1. Severe multivessel disease   2. 95% stenosis in the distal left main   3. 70% stenosis in proximal LAD  4. 80% stenosis in the mid segment of the first marginal branch  -proximal RCA stent widely patent with no significant in-stent restenosis  5. Normal left ventricular filling pressures: the LVEDP was 10-12 mmHg  6. Aortic sclerosis: mean gradient of 10 mmHg.    Patient was put on ASA on last office visit 8/6/24.  I called [facility] spoke with Tatiana. She put a message back to Dr Aly to see how long patient is to hold ASA prior to procedure.     Scanned in CC request.

## 2024-08-09 NOTE — TELEPHONE ENCOUNTER
Called Dr Aly office to check how long patient needs to hold ASA prior to procedure. Message sent back to Dr. Branham on return call back.

## 2024-08-12 NOTE — TELEPHONE ENCOUNTER
This can be addressed on Dr. Houser's return to office     Patient with a cardiac history of:  HFpEF (EF 50-55% 11/2023)  CAD and mitral insufficiency s/p MARK x1 to RCA (3/14/23), s/p CABG/MV repair with Neri annuloplasty ring/MAZE/Atriclip (10/24/23) in [place], [place]  PAF s/p R/L Maze and Atriclip (10/2023). CHADS-Vasc 4 (HTN,CAD,AGE,CHF).     On  mg daily   Asking for cardiac risk stratification for spinal cord stimulator implant scheduled for 9/25/24 with Dr Derrell Aly  Requesting 7 day hold on ASA

## 2024-09-18 ENCOUNTER — OFFICE VISIT (OUTPATIENT)
Dept: ENT CLINIC | Age: 69
End: 2024-09-18
Payer: MEDICARE

## 2024-09-18 VITALS
OXYGEN SATURATION: 96 % | HEART RATE: 78 BPM | BODY MASS INDEX: 42.95 KG/M2 | WEIGHT: 300 LBS | SYSTOLIC BLOOD PRESSURE: 121 MMHG | HEIGHT: 70 IN | DIASTOLIC BLOOD PRESSURE: 77 MMHG

## 2024-09-18 DIAGNOSIS — H92.02 LEFT EAR PAIN: Primary | ICD-10-CM

## 2024-09-18 DIAGNOSIS — H93.13 TINNITUS OF BOTH EARS: ICD-10-CM

## 2024-09-18 DIAGNOSIS — M26.622 ARTHRALGIA OF LEFT TEMPOROMANDIBULAR JOINT: ICD-10-CM

## 2024-09-18 DIAGNOSIS — H60.392 OTHER INFECTIVE CHRONIC OTITIS EXTERNA OF LEFT EAR: ICD-10-CM

## 2024-09-18 PROCEDURE — G8417 CALC BMI ABV UP PARAM F/U: HCPCS | Performed by: OTOLARYNGOLOGY

## 2024-09-18 PROCEDURE — 3074F SYST BP LT 130 MM HG: CPT | Performed by: OTOLARYNGOLOGY

## 2024-09-18 PROCEDURE — 4130F TOPICAL PREP RX AOE: CPT | Performed by: OTOLARYNGOLOGY

## 2024-09-18 PROCEDURE — 3017F COLORECTAL CA SCREEN DOC REV: CPT | Performed by: OTOLARYNGOLOGY

## 2024-09-18 PROCEDURE — 1036F TOBACCO NON-USER: CPT | Performed by: OTOLARYNGOLOGY

## 2024-09-18 PROCEDURE — 99204 OFFICE O/P NEW MOD 45 MIN: CPT | Performed by: OTOLARYNGOLOGY

## 2024-09-18 PROCEDURE — 3078F DIAST BP <80 MM HG: CPT | Performed by: OTOLARYNGOLOGY

## 2024-09-18 PROCEDURE — 1123F ACP DISCUSS/DSCN MKR DOCD: CPT | Performed by: OTOLARYNGOLOGY

## 2024-09-18 PROCEDURE — G8427 DOCREV CUR MEDS BY ELIG CLIN: HCPCS | Performed by: OTOLARYNGOLOGY

## 2024-09-18 RX ORDER — NEOMYCIN SULFATE, POLYMYXIN B SULFATE, HYDROCORTISONE 3.5; 10000; 1 MG/ML; [USP'U]/ML; MG/ML
4 SOLUTION/ DROPS AURICULAR (OTIC) 2 TIMES DAILY
Qty: 10 ML | Refills: 0 | Status: SHIPPED | OUTPATIENT
Start: 2024-09-18 | End: 2024-09-28

## 2024-09-24 ENCOUNTER — HOSPITAL ENCOUNTER (OUTPATIENT)
Dept: PHYSICAL THERAPY | Age: 69
Setting detail: THERAPIES SERIES
Discharge: HOME | End: 2024-09-24
Attending: OTOLARYNGOLOGY
Payer: MEDICARE

## 2024-09-24 DIAGNOSIS — M26.622 ARTHRALGIA OF LEFT TEMPOROMANDIBULAR JOINT: Primary | ICD-10-CM

## 2024-09-24 PROCEDURE — 97140 MANUAL THERAPY 1/> REGIONS: CPT

## 2024-09-24 PROCEDURE — 97161 PT EVAL LOW COMPLEX 20 MIN: CPT

## 2024-09-24 PROCEDURE — 97110 THERAPEUTIC EXERCISES: CPT

## 2024-09-28 ENCOUNTER — PATIENT MESSAGE (OUTPATIENT)
Dept: CARDIOLOGY CLINIC | Age: 69
End: 2024-09-28

## 2024-10-01 ENCOUNTER — HOSPITAL ENCOUNTER (OUTPATIENT)
Dept: PHYSICAL THERAPY | Age: 69
Setting detail: THERAPIES SERIES
Discharge: HOME | End: 2024-10-01
Attending: OTOLARYNGOLOGY
Payer: MEDICARE

## 2024-10-01 PROCEDURE — 97140 MANUAL THERAPY 1/> REGIONS: CPT

## 2024-10-01 NOTE — FLOWSHEET NOTE
[facility]- Outpatient Rehabilitation and Therapy [address], [place] [address] office: [phone] fax: [phone]           Physical Therapy: TREATMENT/PROGRESS NOTE   Patient: Jeremie Heard (69 y.o. male)   Examination Date: 10/01/2024   :  1955 MRN: [id number]   Visit #: 2   Insurance Allowable Auth Needed   Visits this yr: 2 []Yes    [x]No    Insurance: Payor: MEDICARE / Plan: MEDICARE PART A AND B / Product Type: *No Product type* /   Insurance ID: [id number] - (Medicare)  Secondary Insurance (if applicable): BCBS   Treatment Diagnosis:     ICD-10-CM    1. Arthralgia of left temporomandibular joint  M26.622          Medical Diagnosis:  Arthralgia of left temporomandibular joint [M26.622]   Referring Physician: Zeus Reed MD  PCP: Cristel Huber NP-C     Plan of care signed (Y/N):     Date of Patient follow up with Physician:      Plan of Care Report: EVAL today  POC update due: (10 visits /OR AUTH LIMITS, whichever is less)  10/31/2024                                             Medical History:  Comorbidities:  Diabetes (Type I or II)  Hypertension  Depression  Other Cardio/Pulmonary Conditions: A-fib, CABG w/ valve repair  Other:    Relevant Medical History:                                          Precautions/ Contra-indications:           Latex allergy:  NO  Pacemaker:    NO  Contraindications for Manipulation: None  Date of Surgery:   Other:    Red Flags:  None    Suicide Screening:   The patient did not verbalize a primary behavioral concern, suicidal ideation, suicidal intent, or demonstrate suicidal behaviors.    Preferred Language for Healthcare:   [x] English       [] other:    SUBJECTIVE EXAMINATION     Patient stated complaint: Pt states in January he started having left ear pain that goes down into his left jaw, states it is constant at a 4/10. Gets up to 6-7/10 at times, nothing particularly increases. States the most recent ear drops

## 2024-10-04 ENCOUNTER — TELEPHONE (OUTPATIENT)
Dept: ADMINISTRATIVE | Age: 69
End: 2024-10-04

## 2024-10-04 NOTE — TELEPHONE ENCOUNTER
Submitted PA for FARXIGA  [facility] Key: [id number]  STATUS: PENDING.    Follow up done daily; if no decision with in three days we will refax.  If another three days goes by with no decision will call the insurance for status.

## 2024-10-04 NOTE — TELEPHONE ENCOUNTER
Approved today by Jarvis WHATLEY 2017  CaseId:[id number];Status:Approved;Review Type:Prior Auth;Coverage Start Date:10/04/2024;Coverage End Date:12/31/2099;  Authorization Expiration Date: 12/30/2099

## 2024-10-07 NOTE — PROGRESS NOTES
[facility]      Cardiology Consult    Jeremie Heard  1955 October 8, 2024    Referring Physician: Ronni Galeano NP  Reason for Referral: CAD/CHF/atrial fibrillation     CC: \"I need cardiac clearance\"    HPI:  The patient is 69 y.o. male with a past medical history significant for SALVATORE, hypertension, hyperlipidemia, atrial fibrillation (dx 2/2023) s/p DCCV 2/20/23, CHF, and CAD s/p x1 MARK to RCA (3/14/23), s/p CABG/MV repair with Neri annuloplasty ring/MAZE/Atriclip (10/24/23) in [place], [place] who presents for chronic management of CAD, CHF, and atrial fibrillation. He was following with cardiology at [facility], then moved to [place] and reestablished care here 2/1/24. He reported chest pain at his cardiology OV 10/6/23 and completed a normal stress test. He presented to the ED 10/19/23 with recurrent chest pains and underwent an angiogram. He reportedly had normal troponins at the time but he was found to have severe multivessel disease and proceed with CABG. According to his cardiology OV 2/1/24, he has no documented recurrent of atrial fibrillation since his CABG/maze. There is a reported allergy to lisinopril reported as \"cough\" but the medication was restarted after his CABG and is tolerating. Today he presents for follow up and states that overall he is feeling well.  He is planning for surgery at  and needs \"clearance.\" He reports medication compliance and is tolerating. He denies any abnormal bleeding or bruising. He denies exertional chest pain/pressure, dyspnea at rest, worsening MURPHY, PND, orthopnea, palpitations, lightheadedness, weight changes, changes in LE edema, and syncope.     Past Medical History:   Diagnosis Date    Allergic rhinitis     Atrial fibrillation (HCC)     CAD (coronary artery disease)     Depression     Hyperlipidemia     Hypertension     Tremor     Type II or unspecified type diabetes mellitus without mention of complication, not stated as uncontrolled

## 2024-10-08 ENCOUNTER — HOSPITAL ENCOUNTER (OUTPATIENT)
Dept: PHYSICAL THERAPY | Age: 69
Setting detail: THERAPIES SERIES
Discharge: HOME | End: 2024-10-08
Attending: OTOLARYNGOLOGY
Payer: MEDICARE

## 2024-10-08 ENCOUNTER — OFFICE VISIT (OUTPATIENT)
Dept: CARDIOLOGY CLINIC | Age: 69
End: 2024-10-08
Payer: MEDICARE

## 2024-10-08 VITALS
WEIGHT: 315 LBS | DIASTOLIC BLOOD PRESSURE: 80 MMHG | SYSTOLIC BLOOD PRESSURE: 118 MMHG | OXYGEN SATURATION: 98 % | HEART RATE: 88 BPM | BODY MASS INDEX: 45.1 KG/M2 | HEIGHT: 70 IN

## 2024-10-08 DIAGNOSIS — Z95.1 S/P CABG (CORONARY ARTERY BYPASS GRAFT): ICD-10-CM

## 2024-10-08 DIAGNOSIS — Z01.818 PRE-OPERATIVE EXAM: ICD-10-CM

## 2024-10-08 DIAGNOSIS — I50.32 CHRONIC DIASTOLIC HEART FAILURE (HCC): Primary | ICD-10-CM

## 2024-10-08 DIAGNOSIS — Z98.890 S/P MVR (MITRAL VALVE REPAIR): ICD-10-CM

## 2024-10-08 DIAGNOSIS — I48.0 PAF (PAROXYSMAL ATRIAL FIBRILLATION) (HCC): ICD-10-CM

## 2024-10-08 DIAGNOSIS — I25.10 CORONARY ARTERY DISEASE INVOLVING NATIVE CORONARY ARTERY OF NATIVE HEART WITHOUT ANGINA PECTORIS: ICD-10-CM

## 2024-10-08 PROCEDURE — 3074F SYST BP LT 130 MM HG: CPT | Performed by: INTERNAL MEDICINE

## 2024-10-08 PROCEDURE — 97140 MANUAL THERAPY 1/> REGIONS: CPT

## 2024-10-08 PROCEDURE — G8427 DOCREV CUR MEDS BY ELIG CLIN: HCPCS | Performed by: INTERNAL MEDICINE

## 2024-10-08 PROCEDURE — 99214 OFFICE O/P EST MOD 30 MIN: CPT | Performed by: INTERNAL MEDICINE

## 2024-10-08 PROCEDURE — 1123F ACP DISCUSS/DSCN MKR DOCD: CPT | Performed by: INTERNAL MEDICINE

## 2024-10-08 PROCEDURE — G8417 CALC BMI ABV UP PARAM F/U: HCPCS | Performed by: INTERNAL MEDICINE

## 2024-10-08 PROCEDURE — 1036F TOBACCO NON-USER: CPT | Performed by: INTERNAL MEDICINE

## 2024-10-08 PROCEDURE — 3017F COLORECTAL CA SCREEN DOC REV: CPT | Performed by: INTERNAL MEDICINE

## 2024-10-08 PROCEDURE — 93000 ELECTROCARDIOGRAM COMPLETE: CPT | Performed by: INTERNAL MEDICINE

## 2024-10-08 PROCEDURE — 3079F DIAST BP 80-89 MM HG: CPT | Performed by: INTERNAL MEDICINE

## 2024-10-08 PROCEDURE — G8484 FLU IMMUNIZE NO ADMIN: HCPCS | Performed by: INTERNAL MEDICINE

## 2024-10-08 NOTE — FLOWSHEET NOTE
[facility]- Outpatient Rehabilitation and Therapy [address], [place] [address] office: [phone] fax: [phone]           Physical Therapy: TREATMENT/PROGRESS NOTE   Patient: Jeremie Heard (69 y.o. male)   Examination Date: 10/08/2024   :  1955 MRN: [id number]   Visit #: 3   Insurance Allowable Auth Needed   Visits this yr: 3 []Yes    [x]No    Insurance: Payor: MEDICARE / Plan: MEDICARE PART A AND B / Product Type: *No Product type* /   Insurance ID: [id number] - (Medicare)  Secondary Insurance (if applicable): BCBS   Treatment Diagnosis:     ICD-10-CM    1. Arthralgia of left temporomandibular joint  M26.622          Medical Diagnosis:  Arthralgia of left temporomandibular joint [M26.622]   Referring Physician: Zeus Reed MD  PCP: Cristel Huber NP-C     Plan of care signed (Y/N):     Date of Patient follow up with Physician:      Plan of Care Report: EVAL today  POC update due: (10 visits /OR AUTH LIMITS, whichever is less)  2024                                             Medical History:  Comorbidities:  Diabetes (Type I or II)  Hypertension  Depression  Other Cardio/Pulmonary Conditions: A-fib, CABG w/ valve repair  Other:    Relevant Medical History:                                          Precautions/ Contra-indications:           Latex allergy:  NO  Pacemaker:    NO  Contraindications for Manipulation: None  Date of Surgery:   Other:    Red Flags:  None    Suicide Screening:   The patient did not verbalize a primary behavioral concern, suicidal ideation, suicidal intent, or demonstrate suicidal behaviors.    Preferred Language for Healthcare:   [x] English       [] other:    SUBJECTIVE EXAMINATION     Patient stated complaint: Pt states in January he started having left ear pain that goes down into his left jaw, states it is constant at a 4/10. Gets up to 6-7/10 at times, nothing particularly increases. States the most recent ear drops

## 2024-11-26 ENCOUNTER — TELEPHONE (OUTPATIENT)
Dept: CARDIOLOGY CLINIC | Age: 69
End: 2024-11-26

## 2024-11-26 NOTE — TELEPHONE ENCOUNTER
Dr Merrill is requesting advisement on Jeremie Heard to get steroid injection. They have requested to hold ASA 5 days prior to injection.     Patient last seen in office on 10/8/24 for management of SALVATORE, hypertension, hyperlipidemia, atrial fibrillation (dx 2/2023) s/p DCCV 2/20/23, CHF, and CAD s/p x1 MARK to RCA (3/14/23), s/p CABG/MV repair with Neri annuloplasty ring/MAZE/Atriclip (10/24/23) in [place], [place] who presents for chronic management of CAD, CHF, and atrial fibrillation.     LH/RHC 3/14/23 ([facility])  1. There is severe disease of the proximal RCA with moderate disease of a   densely calcified distal Left Main and LAD with mild disease of the   Circumflex   2. Successful PCI of the proximal RCA using an Nikolai Lares 3 x 30 mm MARK which was post dilated with a 3.5 mm non-compliant balloon at high   pressures   3. Negative DFR of the Left Main and proximal LAD   4. Mildly elevated filling pressures with moderate elevation in pulmonary   pressures   5. Preserved cardiac index by mod Elvin   RA 15 mmHg   RV 45/15 mmHg   PA 50/25 mean 33 mmHg   Wedge 20 mmHg     10/8/24 appt:    Pre-operative risk assessment. Patient is intermediate cardiac risk based on RCRI score of 2 (CAD and CHF).  Patient's risk should not preclude him from proceeding with surgery. Suggest continuation of B-blocker and statin in the terri-operative period.  Aspirin may be held 5-7 days prior to surgery if necessary.  Farxiga may be held 3 days prior to surgery and resume when possible.    Please advise.

## 2024-11-26 NOTE — TELEPHONE ENCOUNTER
CARDIAC CLEARANCE     What type of procedure are you having?  STEROID INJECTION    Which physician is performing your procedure?  DR. ARELLANO    When is your procedure scheduled for?  NO DATE YET    Where are you having this procedure?  [facility]    Are you taking Blood Thinners?   If so what? (Name/dose/frequesncy)  YE,ASPIRIN     Does the surgeon want you to stop your blood thinner?  If so for how long?  YES,5 DAYS    Phone Number and Contact Name for Physicians office:  [phone]    Fax number to send information:  [phone]

## 2024-11-27 ENCOUNTER — OFFICE VISIT (OUTPATIENT)
Dept: ENT CLINIC | Age: 69
End: 2024-11-27
Payer: MEDICARE

## 2024-11-27 VITALS
DIASTOLIC BLOOD PRESSURE: 72 MMHG | SYSTOLIC BLOOD PRESSURE: 121 MMHG | HEART RATE: 85 BPM | BODY MASS INDEX: 45.1 KG/M2 | WEIGHT: 315 LBS | HEIGHT: 70 IN | OXYGEN SATURATION: 96 %

## 2024-11-27 DIAGNOSIS — K21.9 LARYNGOPHARYNGEAL REFLUX (LPR): ICD-10-CM

## 2024-11-27 DIAGNOSIS — H92.02 LEFT EAR PAIN: Primary | ICD-10-CM

## 2024-11-27 DIAGNOSIS — R22.1 NECK MASS: ICD-10-CM

## 2024-11-27 PROCEDURE — 3074F SYST BP LT 130 MM HG: CPT | Performed by: OTOLARYNGOLOGY

## 2024-11-27 PROCEDURE — 1123F ACP DISCUSS/DSCN MKR DOCD: CPT | Performed by: OTOLARYNGOLOGY

## 2024-11-27 PROCEDURE — G8484 FLU IMMUNIZE NO ADMIN: HCPCS | Performed by: OTOLARYNGOLOGY

## 2024-11-27 PROCEDURE — 3078F DIAST BP <80 MM HG: CPT | Performed by: OTOLARYNGOLOGY

## 2024-11-27 PROCEDURE — 1159F MED LIST DOCD IN RCRD: CPT | Performed by: OTOLARYNGOLOGY

## 2024-11-27 PROCEDURE — 99214 OFFICE O/P EST MOD 30 MIN: CPT | Performed by: OTOLARYNGOLOGY

## 2024-11-27 PROCEDURE — 3017F COLORECTAL CA SCREEN DOC REV: CPT | Performed by: OTOLARYNGOLOGY

## 2024-11-27 PROCEDURE — 1036F TOBACCO NON-USER: CPT | Performed by: OTOLARYNGOLOGY

## 2024-11-27 PROCEDURE — G8427 DOCREV CUR MEDS BY ELIG CLIN: HCPCS | Performed by: OTOLARYNGOLOGY

## 2024-11-27 PROCEDURE — G8417 CALC BMI ABV UP PARAM F/U: HCPCS | Performed by: OTOLARYNGOLOGY

## 2024-11-27 RX ORDER — PANTOPRAZOLE SODIUM 20 MG/1
20 TABLET, DELAYED RELEASE ORAL
Qty: 30 TABLET | Refills: 5 | Status: SHIPPED | OUTPATIENT
Start: 2024-11-27

## 2024-11-27 NOTE — PROGRESS NOTES
[facility]  DIVISION OF OTOLARYNGOLOGY- HEAD & NECK SURGERY  Follow up      Patient Name: Jeremie Heard  Medical Record Number:  [id number]  Primary Care Physician:  Cristel Huber NP-C  Date of Consultation: 11/27/2024    Chief Complaint: Ear pain        Interval History  Patient is following up for otalgia.  I saw him in September and had him try physical therapy for possible TMJ arthralgia.  He said he still having the pain.  It is just the left ear.  No other symptoms related to the ear.  He does get a lot of phlegm and clears his throat a lot.  He is not a smoker.  He has had cervical neck surgery.            REVIEW OF SYSTEMS  As above    PHYSICAL EXAM  GENERAL: No Acute Distress, Alert and Oriented, no Hoarseness, strong voice  EYES: EOMI, Anti-icteric  HENT:   Head: Normocephalic and atraumatic.   Face:  Symmetric, facial nerve intact, no sinus tenderness  Right Ear: Normal external ear, normal external auditory canal, intact tympanic membrane with normal mobility and aerated middle ear  Left Ear: Normal external ear, normal external auditory canal, intact tympanic membrane with normal mobility and aerated middle ear  Mouth/Oral Cavity:  normal lips, Uvula is midline, no mucosal lesions, no trismus  Oropharynx/Larynx:  normal oropharynx  Nose:Normal external nasal appearance.  Anterior rhinoscopy shows a deviated septum  NECK: Well-healed left neck incision.  He does have a thick neck and difficult to rule out any sort of lesion                ASSESSMENT/PLAN  1. Left ear pain  He has persistent left ear pain.  His ear exam is pretty normal and he did not respond to physical therapy for TMJ arthralgia.  He has had a history of cervical neck surgery and certainly any sort of referred pain from the neck is a possibility.  I would really get a CT scan to rule out a neck mass causing referred pain I will call him with results.    2. Neck mass  As above  - CT SOFT TISSUE NECK W CONTRAST; Future    3.

## 2024-12-05 ENCOUNTER — HOSPITAL ENCOUNTER (OUTPATIENT)
Dept: CT IMAGING | Age: 69
Discharge: HOME | End: 2024-12-05
Attending: OTOLARYNGOLOGY
Payer: COMMERCIAL

## 2024-12-05 DIAGNOSIS — R22.1 NECK MASS: ICD-10-CM

## 2024-12-05 PROCEDURE — 6360000004 HC RX CONTRAST MEDICATION: Performed by: OTOLARYNGOLOGY

## 2024-12-05 PROCEDURE — 70491 CT SOFT TISSUE NECK W/DYE: CPT

## 2024-12-05 RX ORDER — IOPAMIDOL 755 MG/ML
75 INJECTION, SOLUTION INTRAVASCULAR
Status: COMPLETED | OUTPATIENT
Start: 2024-12-05 | End: 2024-12-05

## 2024-12-05 RX ADMIN — IOPAMIDOL 75 ML: 755 INJECTION, SOLUTION INTRAVENOUS at 07:50

## 2024-12-21 ENCOUNTER — HOSPITAL ENCOUNTER (EMERGENCY)
Age: 69
Discharge: HOME | End: 2024-12-21
Payer: COMMERCIAL

## 2024-12-21 ENCOUNTER — APPOINTMENT (OUTPATIENT)
Dept: CT IMAGING | Age: 69
End: 2024-12-21
Payer: COMMERCIAL

## 2024-12-21 VITALS
DIASTOLIC BLOOD PRESSURE: 49 MMHG | TEMPERATURE: 97.8 F | SYSTOLIC BLOOD PRESSURE: 136 MMHG | HEIGHT: 67 IN | HEART RATE: 79 BPM | OXYGEN SATURATION: 97 % | BODY MASS INDEX: 49.44 KG/M2 | RESPIRATION RATE: 14 BRPM | WEIGHT: 315 LBS

## 2024-12-21 DIAGNOSIS — Z85.528 HISTORY OF RENAL CELL CARCINOMA: ICD-10-CM

## 2024-12-21 DIAGNOSIS — R10.12 ABDOMINAL PAIN, LEFT UPPER QUADRANT: Primary | ICD-10-CM

## 2024-12-21 DIAGNOSIS — N28.89 LEFT RENAL MASS: ICD-10-CM

## 2024-12-21 LAB
ALBUMIN SERPL-MCNC: 3.8 G/DL (ref 3.4–5)
ALBUMIN/GLOB SERPL: 1.2 {RATIO} (ref 1.1–2.2)
ALP SERPL-CCNC: 82 U/L (ref 40–129)
ALT SERPL-CCNC: 16 U/L (ref 10–40)
ANION GAP SERPL CALCULATED.3IONS-SCNC: 13 MMOL/L (ref 3–16)
AST SERPL-CCNC: 18 U/L (ref 15–37)
BASOPHILS # BLD: 0 K/UL (ref 0–0.2)
BASOPHILS NFR BLD: 0.3 %
BILIRUB SERPL-MCNC: <0.2 MG/DL (ref 0–1)
BILIRUB UR QL STRIP.AUTO: NEGATIVE
BUN SERPL-MCNC: 35 MG/DL (ref 7–20)
CALCIUM SERPL-MCNC: 8.8 MG/DL (ref 8.3–10.6)
CHLORIDE SERPL-SCNC: 103 MMOL/L (ref 99–110)
CLARITY UR: CLEAR
CO2 SERPL-SCNC: 22 MMOL/L (ref 21–32)
COLOR UR: YELLOW
CREAT SERPL-MCNC: 1.2 MG/DL (ref 0.8–1.3)
DEPRECATED RDW RBC AUTO: 15.1 % (ref 12.4–15.4)
EOSINOPHIL # BLD: 0.2 K/UL (ref 0–0.6)
EOSINOPHIL NFR BLD: 1.6 %
GFR SERPLBLD CREATININE-BSD FMLA CKD-EPI: 66 ML/MIN/{1.73_M2}
GLUCOSE SERPL-MCNC: 117 MG/DL (ref 70–99)
GLUCOSE UR STRIP.AUTO-MCNC: NEGATIVE MG/DL
HCT VFR BLD AUTO: 42.6 % (ref 40.5–52.5)
HGB BLD-MCNC: 14.3 G/DL (ref 13.5–17.5)
HGB UR QL STRIP.AUTO: NEGATIVE
KETONES UR STRIP.AUTO-MCNC: NEGATIVE MG/DL
LEUKOCYTE ESTERASE UR QL STRIP.AUTO: NEGATIVE
LYMPHOCYTES # BLD: 2 K/UL (ref 1–5.1)
LYMPHOCYTES NFR BLD: 16.6 %
MCH RBC QN AUTO: 29.6 PG (ref 26–34)
MCHC RBC AUTO-ENTMCNC: 33.6 G/DL (ref 31–36)
MCV RBC AUTO: 88.3 FL (ref 80–100)
MONOCYTES # BLD: 0.9 K/UL (ref 0–1.3)
MONOCYTES NFR BLD: 7.6 %
NEUTROPHILS # BLD: 8.8 K/UL (ref 1.7–7.7)
NEUTROPHILS NFR BLD: 73.9 %
NITRITE UR QL STRIP.AUTO: NEGATIVE
PH UR STRIP.AUTO: 5 [PH] (ref 5–8)
PLATELET # BLD AUTO: 271 K/UL (ref 135–450)
PMV BLD AUTO: 8.1 FL (ref 5–10.5)
POTASSIUM SERPL-SCNC: 4.1 MMOL/L (ref 3.5–5.1)
PROT SERPL-MCNC: 6.9 G/DL (ref 6.4–8.2)
PROT UR STRIP.AUTO-MCNC: NEGATIVE MG/DL
RBC # BLD AUTO: 4.82 M/UL (ref 4.2–5.9)
SODIUM SERPL-SCNC: 138 MMOL/L (ref 136–145)
SP GR UR STRIP.AUTO: 1.01 (ref 1–1.03)
TROPONIN, HIGH SENSITIVITY: 15 NG/L (ref 0–22)
UA COMPLETE W REFLEX CULTURE PNL UR: NORMAL
UA DIPSTICK W REFLEX MICRO PNL UR: NORMAL
URN SPEC COLLECT METH UR: NORMAL
UROBILINOGEN UR STRIP-ACNC: 0.2 E.U./DL
WBC # BLD AUTO: 11.8 K/UL (ref 4–11)

## 2024-12-21 PROCEDURE — 85025 COMPLETE CBC W/AUTO DIFF WBC: CPT

## 2024-12-21 PROCEDURE — 99285 EMERGENCY DEPT VISIT HI MDM: CPT

## 2024-12-21 PROCEDURE — 93005 ELECTROCARDIOGRAM TRACING: CPT | Performed by: PHYSICIAN ASSISTANT

## 2024-12-21 PROCEDURE — 74177 CT ABD & PELVIS W/CONTRAST: CPT

## 2024-12-21 PROCEDURE — 84484 ASSAY OF TROPONIN QUANT: CPT

## 2024-12-21 PROCEDURE — 6360000004 HC RX CONTRAST MEDICATION: Performed by: PHYSICIAN ASSISTANT

## 2024-12-21 PROCEDURE — 81003 URINALYSIS AUTO W/O SCOPE: CPT

## 2024-12-21 PROCEDURE — 6360000002 HC RX W HCPCS: Performed by: PHYSICIAN ASSISTANT

## 2024-12-21 PROCEDURE — 96374 THER/PROPH/DIAG INJ IV PUSH: CPT

## 2024-12-21 PROCEDURE — 80053 COMPREHEN METABOLIC PANEL: CPT

## 2024-12-21 RX ORDER — IOPAMIDOL 755 MG/ML
75 INJECTION, SOLUTION INTRAVASCULAR
Status: COMPLETED | OUTPATIENT
Start: 2024-12-21 | End: 2024-12-21

## 2024-12-21 RX ORDER — KETOROLAC TROMETHAMINE 15 MG/ML
15 INJECTION, SOLUTION INTRAMUSCULAR; INTRAVENOUS ONCE
Status: COMPLETED | OUTPATIENT
Start: 2024-12-21 | End: 2024-12-21

## 2024-12-21 RX ADMIN — KETOROLAC TROMETHAMINE 15 MG: 15 INJECTION, SOLUTION INTRAMUSCULAR; INTRAVENOUS at 14:19

## 2024-12-21 RX ADMIN — IOPAMIDOL 75 ML: 755 INJECTION, SOLUTION INTRAVENOUS at 15:26

## 2024-12-21 SDOH — ECONOMIC STABILITY: FOOD INSECURITY: WITHIN THE PAST 12 MONTHS, YOU WORRIED THAT YOUR FOOD WOULD RUN OUT BEFORE YOU GOT MONEY TO BUY MORE.: NEVER TRUE

## 2024-12-21 SDOH — ECONOMIC STABILITY: INCOME INSECURITY: IN THE LAST 12 MONTHS, WAS THERE A TIME WHEN YOU WERE NOT ABLE TO PAY THE MORTGAGE OR RENT ON TIME?: NO

## 2024-12-21 SDOH — ECONOMIC STABILITY: FOOD INSECURITY: WITHIN THE PAST 12 MONTHS, THE FOOD YOU BOUGHT JUST DIDN'T LAST AND YOU DIDN'T HAVE MONEY TO GET MORE.: NEVER TRUE

## 2024-12-21 SDOH — ECONOMIC STABILITY: TRANSPORTATION INSECURITY
IN THE PAST 12 MONTHS, HAS LACK OF TRANSPORTATION KEPT YOU FROM MEETINGS, WORK, OR FROM GETTING THINGS NEEDED FOR DAILY LIVING?: NO

## 2024-12-21 SDOH — ECONOMIC STABILITY: INCOME INSECURITY: HOW HARD IS IT FOR YOU TO PAY FOR THE VERY BASICS LIKE FOOD, HOUSING, MEDICAL CARE, AND HEATING?: NOT HARD AT ALL

## 2024-12-21 SDOH — ECONOMIC STABILITY: TRANSPORTATION INSECURITY
IN THE PAST 12 MONTHS, HAS THE LACK OF TRANSPORTATION KEPT YOU FROM MEDICAL APPOINTMENTS OR FROM GETTING MEDICATIONS?: NO

## 2024-12-21 ASSESSMENT — LIFESTYLE VARIABLES
HOW OFTEN DO YOU HAVE A DRINK CONTAINING ALCOHOL: NEVER
HOW MANY STANDARD DRINKS CONTAINING ALCOHOL DO YOU HAVE ON A TYPICAL DAY: PATIENT DOES NOT DRINK

## 2024-12-21 ASSESSMENT — PAIN - FUNCTIONAL ASSESSMENT
PAIN_FUNCTIONAL_ASSESSMENT: 0-10
PAIN_FUNCTIONAL_ASSESSMENT: 0-10

## 2024-12-21 ASSESSMENT — PAIN DESCRIPTION - LOCATION
LOCATION: FLANK;ABDOMEN
LOCATION: ABDOMEN

## 2024-12-21 ASSESSMENT — PAIN DESCRIPTION - DESCRIPTORS
DESCRIPTORS: ACHING
DESCRIPTORS: SHARP;STABBING

## 2024-12-21 ASSESSMENT — PAIN SCALES - GENERAL
PAINLEVEL_OUTOF10: 4
PAINLEVEL_OUTOF10: 5
PAINLEVEL_OUTOF10: 3

## 2024-12-21 ASSESSMENT — PAIN DESCRIPTION - FREQUENCY: FREQUENCY: CONTINUOUS

## 2024-12-21 ASSESSMENT — PAIN DESCRIPTION - ORIENTATION: ORIENTATION: LEFT

## 2024-12-21 ASSESSMENT — PAIN DESCRIPTION - PAIN TYPE: TYPE: ACUTE PAIN

## 2024-12-21 NOTE — ED PROVIDER NOTES
EKG Interpretation #1    Interpreted by emergency department physician  Time performed: 1422  Time read: 1434    Rhythm: Sinus  Ventricular Rate: 77  QRS Axis: -14  Ectopy: none  Conduction: Normal sinus rhythm with low voltage QRS with age-indeterminate septal infarction as determined by poor R wave progression through the anterior chest leads.   ST Segments: normal  T Waves: normal  Q Waves: Inferior    Other findings: Motion artifact making it difficult to read the EKG    Compared to EKG on: 10/8/2024 appears somewhat changed due to Q waves noted in lead III    Clinical Impression: Normal sinus rhythm with low voltage QRS with age-indeterminate septal infarction as determined by poor R wave progression to the anterior chest leads.  There are Q waves noted inferiorly indicating a remote inferior infarction.  There is motion artifact making it difficult to read the EKG.  This is compared to an EKG on 10/8/2024 and appears changed due to Q waves noted in lead III.    DO Diana LINN Charles K, DO  12/21/24 1444

## 2024-12-21 NOTE — ED PROVIDER NOTES
[facility] EMERGENCY DEPARTMENT  EMERGENCY DEPARTMENT ENCOUNTER      Pt Name: Jeremie Heard  MRN: [id number]  Birthdate 1955  Date of evaluation: 12/21/2024  Provider: MARLENE Montenegro  PCP: Cristel Huber NP-C  Note Started: 1:52 PM EST     The ED Attending Physician was available for consultation but did not see or evaluate this patient.    CHIEF COMPLAINT       Chief Complaint   Patient presents with    Flank Pain     Pt from home c/o L flank pain that radiates into L abdomen. Pt states that pain started after a spine stimulator was implanted in October 2024. Pain rated 4/10. Pt states the pain can cause some nausea or issues when getting a deep breath. Pt states he also has intermittent L sided chest pain x6 weeks mostly at night when laying down.       HISTORY OF PRESENT ILLNESS   (Location, Timing/Onset, Context/Setting, Quality, Duration, Modifying Factors, Severity, Associated Signs and Symptoms)  Note limiting factors.     Jeremie Heard is a 69 y.o. male who presents with complaint of pain in the left upper abdomen.  Says been going on ever since he had spinal stimulator implanted in October of this year.  Talk to his orthopedic provider and they thought it was just postsurgical pain, they confirmed that it was in the right place.  Patient has an MRI scheduled for January.  He says the pain has not really changed, it is always there, sometimes it seems connected to the back pain and sometimes it does not.  It is not affected by eating, as he has been eating normally, with no nausea or vomiting.  He has not noticed any exacerbating or alleviating factors.  It is in the left upper quadrant of the abdomen.  Denies any relevant trauma.  Prior to it was implant procedure he had not had pain like this.    Nursing Notes were all reviewed and agreed with or any disagreements were addressed in the HPI.    REVIEW OF SYSTEMS    (2-9 systems for level 4, 10 or more for level 5)  Contrast? None   Final Result   2.7 cm heterogeneously enhancing mass in the upper pole of the left kidney,   concerning for renal cell carcinoma.  Dedicated renal mass protocol CT scan   may be considered.             RECORDS REVIEWED   None.    CONSULTS   None.    PROCEDURES   None.    EMERGENCY DEPARTMENT COURSE and DIFFERENTIAL DIAGNOSIS/MDM:   Vitals:    Vitals:    12/21/24 1251 12/21/24 1253   BP: (!) 136/49    Pulse: 79    Resp: 14    Temp:  97.8 °F (36.6 °C)   TempSrc:  Oral   SpO2: 97%    Weight: (!) 144 kg (317 lb 7.4 oz)    Height: 1.702 m (5' 7\")        Patient was given the following medications:  Medications   ketorolac (TORADOL) injection 15 mg (15 mg IntraVENous Given 12/21/24 1419)   iopamidol (ISOVUE-370) 76 % injection 75 mL (75 mLs IntraVENous Given 12/21/24 1526)           Is this patient to be included in the SEP-1 Core Measure due to severe sepsis or septic shock?   No     Exclusion criteria - the patient is NOT to be included for SEP-1 Core Measure due to:  Infection is not suspected    Laboratory workup showed serum white blood cell count of 11.8, but suspicion for infection is low.  No UTI or hematuria.  Creatinine 1.2, no significant abnormalities otherwise on his laboratory workup.  CT scan showed a 2.7 cm heterogeneously enhancing mass in the upper pole of the left kidney, concerning for renal cell carcinoma.  I discussed this with the patient and he notified me that he has past history of renal cell carcinoma.  Chart shows that in 2020 he had cryoablation of a lesion in the same site.  I spoke perform with our radiologist, who was not able to visually compare images, but said it was not clear enough at this point whether or today's findings are simply sequela of his prior carcinoma and cryoablation or whether it is new.  In any case, there is no indication for hospitalization or further workup.  Patient be discharged with advised to follow-up with his previous urology service and given

## 2024-12-21 NOTE — ED NOTES
D/C: Order noted for d/c. Pt confirmed d/c paperwork has correct name. Discharge and education instructions reviewed with patient. Teach-back successful.  Pt verbalized understanding and denied questions at this time. No acute distress noted. Patient instructed to follow-up as noted - return to emergency department if symptoms worsen. Patient verbalized understanding. Discharged per EDMD with discharge instructions. Pt discharged to private vehicle. Patient stable upon departure. Thanked patient for [facility] for care. Provider aware of patient pain at time of discharge.

## 2024-12-22 LAB
EKG ATRIAL RATE: 77 BPM
EKG DIAGNOSIS: NORMAL
EKG P AXIS: 73 DEGREES
EKG P-R INTERVAL: 144 MS
EKG Q-T INTERVAL: 408 MS
EKG QRS DURATION: 72 MS
EKG QTC CALCULATION (BAZETT): 461 MS
EKG R AXIS: -14 DEGREES
EKG T AXIS: 54 DEGREES
EKG VENTRICULAR RATE: 77 BPM

## 2024-12-22 PROCEDURE — 93010 ELECTROCARDIOGRAM REPORT: CPT | Performed by: INTERNAL MEDICINE

## 2025-01-16 ENCOUNTER — TELEPHONE (OUTPATIENT)
Dept: CARDIOLOGY CLINIC | Age: 70
End: 2025-01-16

## 2025-01-16 NOTE — TELEPHONE ENCOUNTER
Jeremie returning call - Jeremie states he is currently taking Repatha. Relayed to stop taking Zetia. He v/u.    If anything else to add, Jeremie can be reached at [phone].

## 2025-01-16 NOTE — TELEPHONE ENCOUNTER
Received message from Dr. Houser: patient should be taking Repatha injections, but not on med list. Called patient requesting to call back and verify.     Per Dr. Houser, can d/c Zetia if taking Repatha.

## 2025-01-24 ENCOUNTER — TELEPHONE (OUTPATIENT)
Dept: CARDIOLOGY CLINIC | Age: 70
End: 2025-01-24

## 2025-01-24 NOTE — TELEPHONE ENCOUNTER
CARDIAC CLEARANCE     What type of procedure are you having?  Kidney surgery    Which physician is performing your procedure?  Dr. Kavin Milner    When is your procedure scheduled?  02/25    Where are you having this procedure?  [facility]    Are you taking Blood Thinners?  aspirin (HALIMA ASPIRIN) 325 MG tablet     Does the surgeon want you to stop your blood thinner?  If so for how long  7 days    Are you having any new or worsening cardiac symptoms?   No    Phone Number and Contact Name for Physicians office:  [phone]    Fax number to send information:  [phone]    Cardiac History:  Last office visit with Cardiologist:  10/08/24  Cardiac Procedures:    Cardiac Testing:

## 2025-01-27 NOTE — TELEPHONE ENCOUNTER
The patient is 69 y.o. male with a past medical history significant for SALVATORE, hypertension, hyperlipidemia, atrial fibrillation (dx 2/2023) s/p DCCV 2/20/23, CHF, and CAD s/p x1 MARK to RCA (3/14/23), s/p CABG/MV repair with Neri annuloplasty ring/MAZE/Atriclip (10/24/23) in [place], [place] who presents for chronic management of CAD, CHF, and atrial fibrillation.

## 2025-01-27 NOTE — TELEPHONE ENCOUNTER
Needing cardiac risk stratification for robotic L partial nephrectomy (multi-port) with the understanding that total nephrectomy may be necessary.     Needs to hold ASA for 7 days prior    S/p x1 MARK to RCA 3/14/23  S/p CABG x4, MV repair with Neri annuloplasty ring 10/2023    Last OV 10/2024  Cleared for neck surgery at that time

## 2025-01-27 NOTE — TELEPHONE ENCOUNTER
Pre-operative risk assessment. Patient is intermediate cardiac risk based on RCRI score of 2 (CAD and CHF).  Patient's risk should not preclude him from proceeding with surgery. Suggest continuation of B-blocker and statin in the terri-operative period.  Aspirin may be held 5-7 days prior to surgery.  Farxiga may be held 3 days prior to surgery and resume when possible.

## 2025-02-12 ENCOUNTER — TELEPHONE (OUTPATIENT)
Dept: CARDIOLOGY CLINIC | Age: 70
End: 2025-02-12

## 2025-02-12 NOTE — TELEPHONE ENCOUNTER
Dr Montoya is requesting advisement on Jeremie Heard to undergo epidural steroid injection. They are requesting to hold  mg 5 days prior to injection.    Patient last seen in office on 10/8/24 for management of SALVATORE, hypertension, hyperlipidemia, atrial fibrillation (dx 2/2023) s/p DCCV 2/20/23, CHF, and CAD s/p x1 MARK to RCA (3/14/23), s/p CABG/MV repair with Neri annuloplasty ring/MAZE/Atriclip (10/24/23) in [place], [place] who presents for chronic management of CAD, CHF, and atrial fibrillation.     LHC/RHC 3/14/23 ([facility])   LHC 10/21/23 ([facility])   CTA Pulmonary 7/15/24     Chronic diastolic heart failure. NYHA class II. EF 50-55%   CAD s/p CABG (LIMA->LAD, SVG->OM->OM, SVG->PDA).     Paroxysmal atrial fibrillation s/p R/L Maze and Atriclip (10/2023). Asymptomatic. CHADS-Vasc is at least 4 (HTN,CAD,AGE,CHF).     Please advise.

## 2025-02-12 NOTE — TELEPHONE ENCOUNTER
Pre-operative risk assessment. Patient is intermediate cardiac risk based on RCRI score of 2 (CAD and CHF).  Patient's risk should not preclude him from proceeding with surgery. Suggest continuation of B-blocker and statin in the terri-operative period.  Aspirin may be held 5-7 days prior to surgery.  Farxiga may be held 3 days prior to surgery and resume when possible.        Per Dr. Houser on 1/27/24 for L nephrectomy surgery. Okay to use, however, please take out about holding Farxiga, that is not necessary. Thanks.

## 2025-02-12 NOTE — TELEPHONE ENCOUNTER
CARDIAC CLEARANCE     What type of procedure are you having?  Epidural steroid injection    Which physician is performing your procedure?  Dr. Montoya    When is your procedure scheduled?  TBD on clearance     Where are you having this procedure?  [facility]     Are you taking Blood Thinners?   If so what? (Name/dose/frequesncy)  aspirin (HALIMA ASPIRIN) 325 MG tablet / take 1 tablet by mouth daily      Does the surgeon want you to stop your blood thinner?  If so for how long?  5 days prior     Are you having any new or worsening cardiac symptoms?     Phone Number and Contact Name for Physicians office:  [phone]    Fax number to send information:  [phone]    Cardiac History:  Last office visit with Cardiologist:  10/8/24    Cardiac Procedures:    Cardiac Testing:

## 2025-04-01 NOTE — PROGRESS NOTES
[facility]      Cardiology Consult    Jeremie Heard  1955 April 7, 2025    Referring Physician: Ronni Galeano NP  Reason for Referral: CAD/CHF/atrial fibrillation     CC: \"I had cancer removed from my kidney and had resolution of my shortness of breath\"     HPI:  The patient is 69 y.o. male with a past medical history significant for SALVATORE, hypertension, hyperlipidemia, atrial fibrillation (dx 2/2023) s/p DCCV 2/20/23, CHF, and CAD s/p x1 MARK to RCA (3/14/23), s/p CABG/MV repair with Neri annuloplasty ring/MAZE/Atriclip (10/24/23) in [place], [place] who presents for chronic management of CAD, CHF, and atrial fibrillation. He was following with cardiology at [facility], then moved to [place] and reestablished care here 2/1/24. He reported chest pain at his cardiology OV 10/6/23 and completed a normal stress test. He presented to the ED 10/19/23 with recurrent chest pains and underwent an angiogram. He reportedly had normal troponins at the time but he was found to have severe multivessel disease and proceed with CABG. According to his cardiology OV 2/1/24, he has no documented recurrent of atrial fibrillation since his CABG/maze. There is a reported allergy to lisinopril reported as \"cough\" but the medication was restarted after his CABG and is tolerating.   He was seen in office for follow up on 10/8/2024 for preoperative cardiovascular risk assessment prior to his surgery at .  On 10/11/2024 he underwent R C7-T1 hemilaminectomy and foraminotomy with neurosurgery. Today, he presents to office for follow up. He reports he had kidney CA removed which improved shortness of breath and recurrent flank pain he was experiencing. He reports medication compliance and is tolerating. He denies any abnormal bleeding or bruising. He denies exertional chest pain/pressure, dyspnea at rest, worsening MURPHY, PND, orthopnea, palpitations, lightheadedness, weight changes, changes in LE edema, and syncope.     Past

## 2025-04-07 ENCOUNTER — OFFICE VISIT (OUTPATIENT)
Dept: CARDIOLOGY CLINIC | Age: 70
End: 2025-04-07
Payer: COMMERCIAL

## 2025-04-07 VITALS
HEART RATE: 78 BPM | BODY MASS INDEX: 48.09 KG/M2 | DIASTOLIC BLOOD PRESSURE: 62 MMHG | OXYGEN SATURATION: 94 % | HEIGHT: 67 IN | SYSTOLIC BLOOD PRESSURE: 110 MMHG | WEIGHT: 306.4 LBS

## 2025-04-07 DIAGNOSIS — Z95.1 S/P CABG (CORONARY ARTERY BYPASS GRAFT): ICD-10-CM

## 2025-04-07 DIAGNOSIS — I48.0 PAF (PAROXYSMAL ATRIAL FIBRILLATION) (HCC): ICD-10-CM

## 2025-04-07 DIAGNOSIS — Z98.890 S/P MVR (MITRAL VALVE REPAIR): ICD-10-CM

## 2025-04-07 DIAGNOSIS — E78.2 MIXED HYPERLIPIDEMIA: ICD-10-CM

## 2025-04-07 DIAGNOSIS — I25.10 CORONARY ARTERY DISEASE INVOLVING NATIVE CORONARY ARTERY OF NATIVE HEART WITHOUT ANGINA PECTORIS: ICD-10-CM

## 2025-04-07 DIAGNOSIS — I50.32 CHRONIC DIASTOLIC HEART FAILURE (HCC): Primary | ICD-10-CM

## 2025-04-07 DIAGNOSIS — I10 ESSENTIAL HYPERTENSION: ICD-10-CM

## 2025-04-07 PROCEDURE — 1123F ACP DISCUSS/DSCN MKR DOCD: CPT | Performed by: INTERNAL MEDICINE

## 2025-04-07 PROCEDURE — 3078F DIAST BP <80 MM HG: CPT | Performed by: INTERNAL MEDICINE

## 2025-04-07 PROCEDURE — 3074F SYST BP LT 130 MM HG: CPT | Performed by: INTERNAL MEDICINE

## 2025-04-07 PROCEDURE — 99214 OFFICE O/P EST MOD 30 MIN: CPT | Performed by: INTERNAL MEDICINE

## 2025-04-07 PROCEDURE — 1159F MED LIST DOCD IN RCRD: CPT | Performed by: INTERNAL MEDICINE

## 2025-04-07 PROCEDURE — 93000 ELECTROCARDIOGRAM COMPLETE: CPT | Performed by: INTERNAL MEDICINE

## 2025-04-07 RX ORDER — EZETIMIBE 10 MG/1
1 TABLET ORAL DAILY
COMMUNITY

## 2025-04-07 RX ORDER — METHOCARBAMOL 500 MG/1
1 TABLET, FILM COATED ORAL 2 TIMES DAILY
COMMUNITY

## 2025-04-07 RX ORDER — AZELASTINE HYDROCHLORIDE, FLUTICASONE PROPIONATE 137; 50 UG/1; UG/1
SPRAY, METERED NASAL
COMMUNITY
Start: 2024-11-18

## 2025-04-07 RX ORDER — TAMSULOSIN HYDROCHLORIDE 0.4 MG/1
CAPSULE ORAL
COMMUNITY
Start: 2025-03-07

## 2025-04-07 RX ORDER — OXYCODONE AND ACETAMINOPHEN 5; 325 MG/1; MG/1
TABLET ORAL
COMMUNITY
Start: 2025-03-27

## 2025-04-07 RX ORDER — TRAZODONE HYDROCHLORIDE 100 MG/1
TABLET ORAL
COMMUNITY
Start: 2025-03-13

## 2025-04-15 ASSESSMENT — SLEEP AND FATIGUE QUESTIONNAIRES
HOW LIKELY ARE YOU TO NOD OFF OR FALL ASLEEP WHILE SITTING AND TALKING TO SOMEONE: WOULD NEVER DOZE
HOW LIKELY ARE YOU TO NOD OFF OR FALL ASLEEP WHILE SITTING INACTIVE IN A PUBLIC PLACE: SLIGHT CHANCE OF DOZING
HOW LIKELY ARE YOU TO NOD OFF OR FALL ASLEEP WHILE SITTING AND READING: SLIGHT CHANCE OF DOZING
HOW LIKELY ARE YOU TO NOD OFF OR FALL ASLEEP WHILE WATCHING TV: MODERATE CHANCE OF DOZING
HOW LIKELY ARE YOU TO NOD OFF OR FALL ASLEEP WHILE WATCHING TV: MODERATE CHANCE OF DOZING
HOW LIKELY ARE YOU TO NOD OFF OR FALL ASLEEP WHILE SITTING QUIETLY AFTER LUNCH WITHOUT ALCOHOL: SLIGHT CHANCE OF DOZING
HOW LIKELY ARE YOU TO NOD OFF OR FALL ASLEEP WHEN YOU ARE A PASSENGER IN A CAR FOR AN HOUR WITHOUT A BREAK: HIGH CHANCE OF DOZING
HOW LIKELY ARE YOU TO NOD OFF OR FALL ASLEEP IN A CAR, WHILE STOPPED FOR A FEW MINUTES IN TRAFFIC: WOULD NEVER DOZE
ESS TOTAL SCORE: 11
HOW LIKELY ARE YOU TO NOD OFF OR FALL ASLEEP WHILE SITTING INACTIVE IN A PUBLIC PLACE: SLIGHT CHANCE OF DOZING
HOW LIKELY ARE YOU TO NOD OFF OR FALL ASLEEP WHILE SITTING AND READING: SLIGHT CHANCE OF DOZING
HOW LIKELY ARE YOU TO NOD OFF OR FALL ASLEEP WHILE SITTING QUIETLY AFTER LUNCH WITHOUT ALCOHOL: SLIGHT CHANCE OF DOZING
HOW LIKELY ARE YOU TO NOD OFF OR FALL ASLEEP WHILE SITTING AND TALKING TO SOMEONE: WOULD NEVER DOZE
HOW LIKELY ARE YOU TO NOD OFF OR FALL ASLEEP WHEN YOU ARE A PASSENGER IN A CAR FOR AN HOUR WITHOUT A BREAK: HIGH CHANCE OF DOZING
HOW LIKELY ARE YOU TO NOD OFF OR FALL ASLEEP WHILE LYING DOWN TO REST IN THE AFTERNOON WHEN CIRCUMSTANCES PERMIT: HIGH CHANCE OF DOZING
HOW LIKELY ARE YOU TO NOD OFF OR FALL ASLEEP IN A CAR, WHILE STOPPED FOR A FEW MINUTES IN TRAFFIC: WOULD NEVER DOZE
HOW LIKELY ARE YOU TO NOD OFF OR FALL ASLEEP WHILE LYING DOWN TO REST IN THE AFTERNOON WHEN CIRCUMSTANCES PERMIT: HIGH CHANCE OF DOZING

## 2025-04-16 ENCOUNTER — OFFICE VISIT (OUTPATIENT)
Dept: SLEEP MEDICINE | Age: 70
End: 2025-04-16
Payer: MEDICARE

## 2025-04-16 VITALS
WEIGHT: 302 LBS | SYSTOLIC BLOOD PRESSURE: 120 MMHG | OXYGEN SATURATION: 98 % | TEMPERATURE: 96.9 F | BODY MASS INDEX: 47.4 KG/M2 | DIASTOLIC BLOOD PRESSURE: 80 MMHG | RESPIRATION RATE: 18 BRPM | HEIGHT: 67 IN | HEART RATE: 80 BPM

## 2025-04-16 DIAGNOSIS — G47.19 EXCESSIVE DAYTIME SLEEPINESS: ICD-10-CM

## 2025-04-16 DIAGNOSIS — G47.33 OSA (OBSTRUCTIVE SLEEP APNEA): ICD-10-CM

## 2025-04-16 DIAGNOSIS — R53.83 FATIGUE, UNSPECIFIED TYPE: ICD-10-CM

## 2025-04-16 DIAGNOSIS — Z86.79 HISTORY OF ATRIAL FIBRILLATION: ICD-10-CM

## 2025-04-16 DIAGNOSIS — R06.83 SNORING: ICD-10-CM

## 2025-04-16 DIAGNOSIS — I10 PRIMARY HYPERTENSION: Primary | ICD-10-CM

## 2025-04-16 DIAGNOSIS — R06.89 GASPING FOR BREATH: ICD-10-CM

## 2025-04-16 DIAGNOSIS — R06.81 WITNESSED EPISODE OF APNEA: ICD-10-CM

## 2025-04-16 DIAGNOSIS — E66.813 CLASS 3 SEVERE OBESITY DUE TO EXCESS CALORIES WITH SERIOUS COMORBIDITY AND BODY MASS INDEX (BMI) OF 45.0 TO 49.9 IN ADULT: ICD-10-CM

## 2025-04-16 PROCEDURE — 1160F RVW MEDS BY RX/DR IN RCRD: CPT | Performed by: PSYCHIATRY & NEUROLOGY

## 2025-04-16 PROCEDURE — 1159F MED LIST DOCD IN RCRD: CPT | Performed by: PSYCHIATRY & NEUROLOGY

## 2025-04-16 PROCEDURE — 3079F DIAST BP 80-89 MM HG: CPT | Performed by: PSYCHIATRY & NEUROLOGY

## 2025-04-16 PROCEDURE — 3074F SYST BP LT 130 MM HG: CPT | Performed by: PSYCHIATRY & NEUROLOGY

## 2025-04-16 PROCEDURE — G8417 CALC BMI ABV UP PARAM F/U: HCPCS | Performed by: PSYCHIATRY & NEUROLOGY

## 2025-04-16 PROCEDURE — 99204 OFFICE O/P NEW MOD 45 MIN: CPT | Performed by: PSYCHIATRY & NEUROLOGY

## 2025-04-16 PROCEDURE — 1123F ACP DISCUSS/DSCN MKR DOCD: CPT | Performed by: PSYCHIATRY & NEUROLOGY

## 2025-04-16 PROCEDURE — G8427 DOCREV CUR MEDS BY ELIG CLIN: HCPCS | Performed by: PSYCHIATRY & NEUROLOGY

## 2025-04-16 PROCEDURE — 1036F TOBACCO NON-USER: CPT | Performed by: PSYCHIATRY & NEUROLOGY

## 2025-04-16 PROCEDURE — 3017F COLORECTAL CA SCREEN DOC REV: CPT | Performed by: PSYCHIATRY & NEUROLOGY

## 2025-04-16 ASSESSMENT — ENCOUNTER SYMPTOMS
RESPIRATORY NEGATIVE: 1
GASTROINTESTINAL NEGATIVE: 1
EYES NEGATIVE: 1
ALLERGIC/IMMUNOLOGIC NEGATIVE: 1

## 2025-04-16 NOTE — PATIENT INSTRUCTIONS
Orders Placed This Encounter   Procedures    Home Sleep Study     Standing Status:   Future     Expected Date:   4/16/2025     Expiration Date:   4/16/2026     Location For Sleep Study:   [facility] Sleep Lab Location:   [facility]

## 2025-04-16 NOTE — PROGRESS NOTES
MD CLOTILDE Pandey Board Certified in Sleep Medicine  [facility]  Board Certified in Neurology [facility]  [address]   [address]  [place]  [address]  P- [phone]   [facility]   [address]  [address]   [place], [place] [address]           [facility] [place] [facility] SLEEP [facility]IC[facility]  [address] [place] [address] [phone]    Subjective:     Patient ID: Jeremie Heard is a 69 y.o. male.    Chief Complaint   Patient presents with    Establish Care    Sleep Problem       HPI:        Jeremie Heard is a 69 y.o. male referred by Dr. Huber for a sleep evaluation. He was diagnosed with SALVATORE when he had A-Fib 2 years ago, s/p ablation and open heart surgery, currently oes not have much symptoms.   No snoring or apnea at this time    SLEEP SCHEDULE: Goes to bed around 12 AM in the weekdays and 12 AM in the weekends. It usually takes the patient 10-15 minutes to fall asleep. The patient gets up 2-3 per night to go to the bathroom. The Patient finally gets up at 7 AM during the weekdays and 10 AM in the weekends. patient wakes up with dry mouth.  The patient has restless sleep with frequent arousals in addition to the Patient has significant daytime sleepiness. The Patient scored [place] Sleepiness Score: (Patient-Rptd) 11 on [place] Sleepiness Scale ( more than 10 is indicative of daytime sleepiness)and 43 in fatigue scale ( more than 36 is indicative of daytime fatigue). The patient takes no naps.    Previous evaluation and treatment has included- PSG and PSG with C PAP titration. Over 2 years, could  not tolerate the PAP.         Diagnosed in 2/25/2023 by sleep study ordered by sleep medicine through [facility]. Started on BiPap. Patient did not tolerate. Went to multiple appointments where they adjusted settings but no improvement in tolerance. Patient has not seen

## 2025-06-25 RX ORDER — DAPAGLIFLOZIN 10 MG/1
10 TABLET, FILM COATED ORAL EVERY MORNING
Qty: 90 TABLET | Refills: 3 | Status: SHIPPED | OUTPATIENT
Start: 2025-06-25

## 2025-06-25 NOTE — TELEPHONE ENCOUNTER
Requested Prescriptions     Pending Prescriptions Disp Refills    FARXIGA 10 MG tablet [Pharmacy Med Name: FARXIGA 10MG TABLETS] 90 tablet 3     Sig: TAKE 1 TABLET BY MOUTH EVERY MORNING        Last OV: 4/7/2025  Next OV: 10/27/2025  Last refill:6/25/2024

## 2025-08-20 ENCOUNTER — TELEPHONE (OUTPATIENT)
Dept: PULMONOLOGY | Age: 70
End: 2025-08-20